# Patient Record
Sex: FEMALE | Race: AMERICAN INDIAN OR ALASKA NATIVE
[De-identification: names, ages, dates, MRNs, and addresses within clinical notes are randomized per-mention and may not be internally consistent; named-entity substitution may affect disease eponyms.]

---

## 2020-04-24 ENCOUNTER — HOSPITAL ENCOUNTER (INPATIENT)
Dept: HOSPITAL 11 - JP.ED | Age: 20
LOS: 3 days | Discharge: HOME | DRG: 871 | End: 2020-04-27
Attending: INTERNAL MEDICINE | Admitting: INTERNAL MEDICINE
Payer: MEDICAID

## 2020-04-24 DIAGNOSIS — Z90.49: ICD-10-CM

## 2020-04-24 DIAGNOSIS — F11.10: ICD-10-CM

## 2020-04-24 DIAGNOSIS — F15.10: ICD-10-CM

## 2020-04-24 DIAGNOSIS — E86.0: ICD-10-CM

## 2020-04-24 DIAGNOSIS — F17.210: ICD-10-CM

## 2020-04-24 DIAGNOSIS — J18.9: ICD-10-CM

## 2020-04-24 DIAGNOSIS — A41.01: Primary | ICD-10-CM

## 2020-04-24 PROCEDURE — C9113 INJ PANTOPRAZOLE SODIUM, VIA: HCPCS

## 2020-04-24 RX ADMIN — TAZOBACTAM SODIUM AND PIPERACILLIN SODIUM SCH MLS/HR: 375; 3 INJECTION, SOLUTION INTRAVENOUS at 23:11

## 2020-04-24 RX ADMIN — GUAIFENESIN AND CODEINE PHOSPHATE PRN ML: 10; 100 LIQUID ORAL at 23:54

## 2020-04-24 RX ADMIN — ALBUTEROL SULFATE PRN MG: 2.5 SOLUTION RESPIRATORY (INHALATION) at 20:17

## 2020-04-24 RX ADMIN — TAZOBACTAM SODIUM AND PIPERACILLIN SODIUM SCH MLS/HR: 375; 3 INJECTION, SOLUTION INTRAVENOUS at 17:28

## 2020-04-24 NOTE — EDM.PDOC
ED HPI GENERAL MEDICAL PROBLEM





- General


Chief Complaint: Chest Pain


Stated Complaint: MEDICAL VIA NORTH


Time Seen by Provider: 04/24/20 03:45


Source of Information: Reports: Patient, EMS


History Limitations: Reports: No Limitations





- History of Present Illness


INITIAL COMMENTS - FREE TEXT/NARRATIVE: 





pt is having severe chest pain. She is chilling. She has a known history of a 

iv drug user-- she uses meth. 


Onset: Today


Duration: Hour(s):


Location: Reports: Chest


Associated Symptoms: Reports: Chest Pain, Fever/Chills, Shortness of Breath, 

Other (pt does admit to using meth this am. )





- Related Data


 Allergies











Allergy/AdvReac Type Severity Reaction Status Date / Time


 


No Known Allergies Allergy   Verified 04/24/20 03:22











Home Meds: 


 Home Meds





NK [No Known Home Meds]  04/24/20 [History]











Past Medical History


OB/GYN History: Reports: Pregnancy


Musculoskeletal History: Reports: Fracture





- Past Surgical History


GI Surgical History: Reports: Cholecystectomy





Social & Family History





- Recreational Drug Use


Recreational Drug Use: Yes


Recreational Drug Type: Reports: Methamphetamine


Recreational Drug Use Frequency: Daily





ED ROS GENERAL





- Review of Systems


Review Of Systems: See Below


Constitutional: Reports: Fever, Chills, Malaise, Other (pain in the chest. )


HEENT: Reports: No Symptoms


Respiratory: Reports: Shortness of Breath, Pleuritic Chest Pain


Cardiovascular: Reports: Chest Pain, Palpitations


Endocrine: Reports: No Symptoms


GI/Abdominal: Reports: No Symptoms


: Reports: Other ( she is having a problem voiding. )


Musculoskeletal: Reports: No Symptoms


Skin: Reports: No Symptoms





ED EXAM, GENERAL





- Physical Exam


Exam: See Below


Free Text/Narrative:: 





pt arrived with severe chest pain with deep breathing. She feels very sob. She 

has good oxgenation but she has a tachycardia and rapid resp. 


Exam Limited By: No Limitations


General Appearance: Alert, Anxious, Moderate Distress


Ears: Normal TMs


Nose: Normal Inspection


Throat/Mouth: Normal Inspection


Head: Atraumatic


Neck: Normal Inspection


Respiratory/Chest: Rales, Other (pt had a resp rate of 38 on arrival. She has a 

fever. )


Cardiovascular: Regular Rate, Rhythm, Tachycardia, Other (pt has a sinus tach 

of 138)


GI/Abdominal: Soft, Non-Tender


 (Female) Exam: Deferred, Other (pt has a IUD  in place for birth control)


Rectal (Female) Exam: Deferred


Back Exam: Normal Inspection


Psychiatric: Anxious





Course





- Vital Signs


Last Recorded V/S: 


 Last Vital Signs











Temp  37.8 C   04/24/20 03:22


 


Pulse  132 H  04/24/20 03:22


 


Resp  38 H  04/24/20 03:22


 


BP  130/80   04/24/20 03:22


 


Pulse Ox  98   04/24/20 03:22














- Orders/Labs/Meds


Orders: 


 Active Orders 24 hr











 Category Date Time Status


 


 EKG Documentation Completion [RC] ASDIRECTED Care  04/24/20 04:00 Active


 


 Ang Chest [CT] Stat Exams  04/24/20 04:13 Ordered


 


 Chest 1V Frontal [CR] Stat Exams  04/24/20 03:37 Taken


 


 CULTURE BLOOD [BC] Urgent Lab  04/24/20 03:45 Received


 


 CULTURE BLOOD [BC] Urgent Lab  04/24/20 03:52 Received


 


 Iopamidol [Isovue-370 (76%)] Med  04/24/20 04:30 Active





 75 ml IV .AS DIRECTED   


 


 Piperacillin/Tazobactam [Zosyn] 4.5 gm Med  04/24/20 04:08 Active





 Sodium Chloride 0.9% [Normal Saline] 100 ml   





 IV ONETIME   


 


 Sodium Chloride 0.9% [Normal Saline] 1,000 ml Med  04/24/20 03:45 Active





 IV ASDIRECTED   


 


 Sodium Chloride 0.9% [Normal Saline] 1,000 ml Med  04/24/20 04:15 Active





 IV ASDIRECTED   


 


 Sodium Chloride 0.9% [Saline Flush] Med  04/24/20 04:30 Active





 10 ml FLUSH ONETIME PRN   


 


 Blood Culture x2 Reflex Set [OM.PC] Urgent Oth  04/24/20 03:43 Ordered


 


 EKG 12 Lead [EK] Routine Ther  04/24/20 04:00 Ordered








 Medication Orders





Sodium Chloride (Normal Saline)  1,000 mls @ 999 mls/hr IV ASDIRECTED CINDY


   Last Admin: 04/24/20 03:43  Dose: 999 mls/hr


Sodium Chloride (Normal Saline)  1,000 mls @ 999 mls/hr IV ASDIRECTED CINDY


   Last Admin: 04/24/20 04:40  Dose: 999 mls/hr


Piperacillin Sod/Tazobactam (Sod 4.5 gm/ Sodium Chloride)  100 mls @ 100 mls/hr 

IV ONETIME ONE


   Stop: 04/24/20 05:07


   Last Admin: 04/24/20 04:32  Dose: 100 mls/hr


Iopamidol (Isovue-370 (76%))  75 ml IV .AS DIRECTED CINDY


Sodium Chloride (Saline Flush)  10 ml FLUSH ONETIME PRN


   PRN Reason: PER RADIOLOGY PROTOCOL








Labs: 


 Laboratory Tests











  04/24/20 04/24/20 04/24/20 Range/Units





  03:45 03:45 03:45 


 


WBC  15.4 H    (4.5-11.0)  K/uL


 


RBC  4.56    (3.30-5.50)  M/uL


 


Hgb  12.2    (12.0-15.0)  g/dL


 


Hct  37.7    (36.0-48.0)  %


 


MCV  83    (80-98)  fL


 


MCH  27    (27-31)  pg


 


MCHC  32    (32-36)  %


 


Plt Count  407 H    (150-400)  K/uL


 


Neut % (Auto)  58    (36-66)  %


 


Lymph % (Auto)  29    (24-44)  %


 


Mono % (Auto)  13 H    (2-6)  %


 


Eos % (Auto)  0 L    (2-4)  %


 


Baso % (Auto)  1    (0-1)  %


 


VBG pH     (7.350-7.450)  


 


Sodium   130 L   (140-148)  mmol/L


 


Potassium   3.5 L   (3.6-5.2)  mmol/L


 


Chloride   98 L   (100-108)  mmol/L


 


Carbon Dioxide   23   (21-32)  mmol/L


 


Anion Gap   12.5   (5.0-14.0)  mmol/L


 


BUN   7   (7-18)  mg/dL


 


Creatinine   0.8   (0.6-1.0)  mg/dL


 


Est Cr Clr Drug Dosing   96.06   mL/min


 


Estimated GFR (MDRD)   > 60   (>60)  


 


Glucose   111 H   ()  mg/dL


 


Lactic Acid     (0.4-2.0)  mmol/L


 


Calcium   7.4 L   (8.5-10.1)  mg/dL


 


Total Bilirubin   0.6   (0.2-1.0)  mg/dL


 


AST   49 H   (15-37)  U/L


 


ALT   56   (12-78)  U/L


 


Alkaline Phosphatase   106   ()  U/L


 


Troponin I    < 0.017  (0.000-0.056)  ng/mL


 


Total Protein   7.4   (6.4-8.2)  g/dL


 


Albumin   2.2 L   (3.4-5.0)  g/dL


 


Globulin   5.2 H   (2.3-3.5)  g/dL


 


Albumin/Globulin Ratio   0.4 L   (1.2-2.2)  


 


Urine Color     (YELLOW)  


 


Urine Appearance     (CLEAR)  


 


Urine pH     (5.0-8.0)  


 


Ur Specific Gravity     (1.008-1.030)  


 


Urine Protein     (NEGATIVE)  mg/dL


 


Urine Glucose (UA)     (NEGATIVE)  mg/dL


 


Urine Ketones     (NEGATIVE)  mg/dL


 


Urine Occult Blood     (NEGATIVE)  


 


Urine Nitrite     (NEGATIVE)  


 


Urine Bilirubin     (NEGATIVE)  


 


Urine Urobilinogen     (0.2-1.0)  EU/dL


 


Ur Leukocyte Esterase     (NEGATIVE)  


 


Urine RBC     (0-5)  


 


Urine WBC     (0-5)  


 


Ur Epithelial Cells     


 


Amorphous Sediment     


 


Urine Bacteria     


 


Urine Mucus     


 


Urine HCG, Qual     


 


Urine Opiates Screen     (NEGATIVE)  


 


Ur Oxycodone Screen     (NEGATIVE)  


 


Urine Methadone Screen     (NEGATIVE)  


 


Ur Propoxyphene Screen     (NEGATIVE)  


 


Ur Barbiturates Screen     (NEGATIVE)  


 


Ur Tricyclics Screen     (NEGATIVE)  


 


Ur Phencyclidine Scrn     (NEGATIVE)  


 


Ur Amphetamine Screen     (NEGATIVE)  


 


U Methamphetamines Scrn     (NEGATIVE)  


 


Urine MDMA Screen     (NEGATIVE)  


 


U Benzodiazepines Scrn     (NEGATIVE)  


 


U Cocaine Metab Screen     (NEGATIVE)  


 


U Marijuana (THC) Screen     (NEGATIVE)  














  04/24/20 04/24/20 04/24/20 Range/Units





  03:45 03:54 03:54 


 


WBC     (4.5-11.0)  K/uL


 


RBC     (3.30-5.50)  M/uL


 


Hgb     (12.0-15.0)  g/dL


 


Hct     (36.0-48.0)  %


 


MCV     (80-98)  fL


 


MCH     (27-31)  pg


 


MCHC     (32-36)  %


 


Plt Count     (150-400)  K/uL


 


Neut % (Auto)     (36-66)  %


 


Lymph % (Auto)     (24-44)  %


 


Mono % (Auto)     (2-6)  %


 


Eos % (Auto)     (2-4)  %


 


Baso % (Auto)     (0-1)  %


 


VBG pH     (7.350-7.450)  


 


Sodium     (140-148)  mmol/L


 


Potassium     (3.6-5.2)  mmol/L


 


Chloride     (100-108)  mmol/L


 


Carbon Dioxide     (21-32)  mmol/L


 


Anion Gap     (5.0-14.0)  mmol/L


 


BUN     (7-18)  mg/dL


 


Creatinine     (0.6-1.0)  mg/dL


 


Est Cr Clr Drug Dosing     mL/min


 


Estimated GFR (MDRD)     (>60)  


 


Glucose     ()  mg/dL


 


Lactic Acid  1.4    (0.4-2.0)  mmol/L


 


Calcium     (8.5-10.1)  mg/dL


 


Total Bilirubin     (0.2-1.0)  mg/dL


 


AST     (15-37)  U/L


 


ALT     (12-78)  U/L


 


Alkaline Phosphatase     ()  U/L


 


Troponin I     (0.000-0.056)  ng/mL


 


Total Protein     (6.4-8.2)  g/dL


 


Albumin     (3.4-5.0)  g/dL


 


Globulin     (2.3-3.5)  g/dL


 


Albumin/Globulin Ratio     (1.2-2.2)  


 


Urine Color   Yellow   (YELLOW)  


 


Urine Appearance   Slightly cloudy A   (CLEAR)  


 


Urine pH   6.0   (5.0-8.0)  


 


Ur Specific Gravity   1.025   (1.008-1.030)  


 


Urine Protein   30 H   (NEGATIVE)  mg/dL


 


Urine Glucose (UA)   Negative   (NEGATIVE)  mg/dL


 


Urine Ketones   Negative   (NEGATIVE)  mg/dL


 


Urine Occult Blood   Negative   (NEGATIVE)  


 


Urine Nitrite   Negative   (NEGATIVE)  


 


Urine Bilirubin   Small H   (NEGATIVE)  


 


Urine Urobilinogen   4.0 H   (0.2-1.0)  EU/dL


 


Ur Leukocyte Esterase   Negative   (NEGATIVE)  


 


Urine RBC   0-5   (0-5)  


 


Urine WBC   0-5   (0-5)  


 


Ur Epithelial Cells   Few   


 


Amorphous Sediment   Not seen   


 


Urine Bacteria   Not seen   


 


Urine Mucus   Moderate   


 


Urine HCG, Qual     


 


Urine Opiates Screen    Negative  (NEGATIVE)  


 


Ur Oxycodone Screen    Negative  (NEGATIVE)  


 


Urine Methadone Screen    Negative  (NEGATIVE)  


 


Ur Propoxyphene Screen    Negative  (NEGATIVE)  


 


Ur Barbiturates Screen    Negative  (NEGATIVE)  


 


Ur Tricyclics Screen    Negative  (NEGATIVE)  


 


Ur Phencyclidine Scrn    Negative  (NEGATIVE)  


 


Ur Amphetamine Screen    Presumptive positive H  (NEGATIVE)  


 


U Methamphetamines Scrn    Presumptive positive H  (NEGATIVE)  


 


Urine MDMA Screen    Presumptive positive H  (NEGATIVE)  


 


U Benzodiazepines Scrn    Negative  (NEGATIVE)  


 


U Cocaine Metab Screen    Negative  (NEGATIVE)  


 


U Marijuana (THC) Screen    Presumptive positive H  (NEGATIVE)  














  04/24/20 04/24/20 Range/Units





  03:54 04:03 


 


WBC    (4.5-11.0)  K/uL


 


RBC    (3.30-5.50)  M/uL


 


Hgb    (12.0-15.0)  g/dL


 


Hct    (36.0-48.0)  %


 


MCV    (80-98)  fL


 


MCH    (27-31)  pg


 


MCHC    (32-36)  %


 


Plt Count    (150-400)  K/uL


 


Neut % (Auto)    (36-66)  %


 


Lymph % (Auto)    (24-44)  %


 


Mono % (Auto)    (2-6)  %


 


Eos % (Auto)    (2-4)  %


 


Baso % (Auto)    (0-1)  %


 


VBG pH   7.459 H  (7.350-7.450)  


 


Sodium    (140-148)  mmol/L


 


Potassium    (3.6-5.2)  mmol/L


 


Chloride    (100-108)  mmol/L


 


Carbon Dioxide    (21-32)  mmol/L


 


Anion Gap    (5.0-14.0)  mmol/L


 


BUN    (7-18)  mg/dL


 


Creatinine    (0.6-1.0)  mg/dL


 


Est Cr Clr Drug Dosing    mL/min


 


Estimated GFR (MDRD)    (>60)  


 


Glucose    ()  mg/dL


 


Lactic Acid    (0.4-2.0)  mmol/L


 


Calcium    (8.5-10.1)  mg/dL


 


Total Bilirubin    (0.2-1.0)  mg/dL


 


AST    (15-37)  U/L


 


ALT    (12-78)  U/L


 


Alkaline Phosphatase    ()  U/L


 


Troponin I    (0.000-0.056)  ng/mL


 


Total Protein    (6.4-8.2)  g/dL


 


Albumin    (3.4-5.0)  g/dL


 


Globulin    (2.3-3.5)  g/dL


 


Albumin/Globulin Ratio    (1.2-2.2)  


 


Urine Color    (YELLOW)  


 


Urine Appearance    (CLEAR)  


 


Urine pH    (5.0-8.0)  


 


Ur Specific Gravity    (1.008-1.030)  


 


Urine Protein    (NEGATIVE)  mg/dL


 


Urine Glucose (UA)    (NEGATIVE)  mg/dL


 


Urine Ketones    (NEGATIVE)  mg/dL


 


Urine Occult Blood    (NEGATIVE)  


 


Urine Nitrite    (NEGATIVE)  


 


Urine Bilirubin    (NEGATIVE)  


 


Urine Urobilinogen    (0.2-1.0)  EU/dL


 


Ur Leukocyte Esterase    (NEGATIVE)  


 


Urine RBC    (0-5)  


 


Urine WBC    (0-5)  


 


Ur Epithelial Cells    


 


Amorphous Sediment    


 


Urine Bacteria    


 


Urine Mucus    


 


Urine HCG, Qual  Negative   


 


Urine Opiates Screen    (NEGATIVE)  


 


Ur Oxycodone Screen    (NEGATIVE)  


 


Urine Methadone Screen    (NEGATIVE)  


 


Ur Propoxyphene Screen    (NEGATIVE)  


 


Ur Barbiturates Screen    (NEGATIVE)  


 


Ur Tricyclics Screen    (NEGATIVE)  


 


Ur Phencyclidine Scrn    (NEGATIVE)  


 


Ur Amphetamine Screen    (NEGATIVE)  


 


U Methamphetamines Scrn    (NEGATIVE)  


 


Urine MDMA Screen    (NEGATIVE)  


 


U Benzodiazepines Scrn    (NEGATIVE)  


 


U Cocaine Metab Screen    (NEGATIVE)  


 


U Marijuana (THC) Screen    (NEGATIVE)  











Meds: 


Medications











Generic Name Dose Route Start Last Admin





  Trade Name Freq  PRN Reason Stop Dose Admin


 


Sodium Chloride  1,000 mls @ 999 mls/hr  04/24/20 03:45  04/24/20 03:43





  Normal Saline  IV   999 mls/hr





  ASDIRECTED CINDY   Administration





     





     





     





     


 


Sodium Chloride  1,000 mls @ 999 mls/hr  04/24/20 04:15  04/24/20 04:40





  Normal Saline  IV   999 mls/hr





  ASDIRECTED CINDY   Administration





     





     





     





     


 


Piperacillin Sod/Tazobactam  100 mls @ 100 mls/hr  04/24/20 04:08  04/24/20 04:

32





  Sod 4.5 gm/ Sodium Chloride  IV  04/24/20 05:07  100 mls/hr





  ONETIME ONE   Administration





     





     





     





     


 


Iopamidol  75 ml  04/24/20 04:30  





  Isovue-370 (76%)  IV   





  .AS DIRECTED CINDY   





     





     





     





     


 


Sodium Chloride  10 ml  04/24/20 04:30  





  Saline Flush  FLUSH   





  ONETIME PRN   





  PER RADIOLOGY PROTOCOL   





     





     





     














Discontinued Medications














Generic Name Dose Route Start Last Admin





  Trade Name Freq  PRN Reason Stop Dose Admin


 


Acetaminophen  650 mg  04/24/20 04:04  04/24/20 04:07





  Tylenol  PO  04/24/20 04:05  650 mg





  NOW ONE   Administration





     





     





     





     


 


Hydromorphone HCl  0.5 mg  04/24/20 04:26  04/24/20 04:30





  Dilaudid  IVPUSH  04/24/20 04:27  0.5 mg





  ONETIME ONE   Administration





     





     





     





     


 


Sodium Chloride  Confirm  04/24/20 04:18  04/24/20 04:31





  Normal Saline  Administered  04/24/20 04:19  Not Given





  Dose   





  100 mls @ as directed   





  .ROUTE   





  .STK-MED ONE   





     





     





     





     


 


Sodium Chloride  90 mls @ 3 mls/sec  04/24/20 04:30  





  Normal Saline  IV  04/24/20 04:31  





  ONETIME ONE   





     





     





     





     


 


Lorazepam  0.5 mg  04/24/20 04:17  04/24/20 04:31





  Ativan  IVPUSH  04/24/20 04:18  0.5 mg





  ONETIME ONE   Administration





     





     





     





     














- Re-Assessments/Exams


Free Text/Narrative Re-Assessment/Exam: 





04/24/20 04:52


pt denies being around anyone who has been ill, She has not traveled and she 

has has not been in [place]. She has a fever but she has not had a cough, 

She is having pleuritic chest pain. 





Departure





- Departure


Time of Disposition: 04:58


Disposition: Admitted As Inpatient 64


Condition: Fair


Clinical Impression: 


 Pleuritic chest pain, IV drug abuse, Sepsis, Dehydration








- Discharge Information


Referrals: 


PCP,None [Primary Care Provider] - 


Forms:  ED Department Discharge


Care Plan Goals: 


admit to [facility]





Sepsis Event Note





- Evaluation


Sepsis Screening Result: Possible Sepsis Risk





- Focused Exam


Vital Signs: 


 Vital Signs











  Temp Pulse Resp BP Pulse Ox


 


 04/24/20 03:22  37.8 C  132 H  38 H  130/80  98











Date Exam was Performed: 04/24/20


Time Exam was Performed: 04:58





- My Orders


Last 24 Hours: 


My Active Orders





04/24/20 03:37


Chest 1V Frontal [CR] Stat 





04/24/20 03:43


Blood Culture x2 Reflex Set [OM.PC] Urgent 





04/24/20 03:45


CULTURE BLOOD [BC] Urgent 


Sodium Chloride 0.9% [Normal Saline] 1,000 ml IV ASDIRECTED 





04/24/20 03:52


CULTURE BLOOD [BC] Urgent 





04/24/20 04:00


EKG Documentation Completion [RC] ASDIRECTED 


EKG 12 Lead [EK] Routine 





04/24/20 04:08


Piperacillin/Tazobactam [Zosyn] 4.5 gm   Sodium Chloride 0.9% [Normal Saline] 

100 ml IV ONETIME 





04/24/20 04:13


Ang Chest [CT] Stat 





04/24/20 04:15


Sodium Chloride 0.9% [Normal Saline] 1,000 ml IV ASDIRECTED 





04/24/20 04:30


Iopamidol [Isovue-370 (76%)]   75 ml IV .AS DIRECTED 


Sodium Chloride 0.9% [Saline Flush]   10 ml FLUSH ONETIME PRN 














- Assessment/Plan


Last 24 Hours: 


My Active Orders





04/24/20 03:37


Chest 1V Frontal [CR] Stat 





04/24/20 03:43


Blood Culture x2 Reflex Set [OM.PC] Urgent 





04/24/20 03:45


CULTURE BLOOD [BC] Urgent 


Sodium Chloride 0.9% [Normal Saline] 1,000 ml IV ASDIRECTED 





04/24/20 03:52


CULTURE BLOOD [BC] Urgent 





04/24/20 04:00


EKG Documentation Completion [RC] ASDIRECTED 


EKG 12 Lead [EK] Routine 





04/24/20 04:08


Piperacillin/Tazobactam [Zosyn] 4.5 gm   Sodium Chloride 0.9% [Normal Saline] 

100 ml IV ONETIME 





04/24/20 04:13


Ang Chest [CT] Stat 





04/24/20 04:15


Sodium Chloride 0.9% [Normal Saline] 1,000 ml IV ASDIRECTED 





04/24/20 04:30


Iopamidol [Isovue-370 (76%)]   75 ml IV .AS DIRECTED 


Sodium Chloride 0.9% [Saline Flush]   10 ml FLUSH ONETIME PRN

## 2020-04-24 NOTE — CRLCT
INDICATION:



Shortness of breath and pleuritic pain



TECHNIQUE:



CT chest pulmonary angiogram acquired with IV contrast. 75 cc Isovue 370 



COMPARISON:



None 



FINDINGS:



Cardiovascular structures: Normal vascular enhancement of the pulmonary 

arteries, no sign of pulmonary embolism.  Heart size is normal.  No sign of 

aneurysm or dissection in the thoracic aorta.  



Mediastinum and jesus: No mass or adenopathy.  



Lungs: Right middle and right lower lobe opacities consistent with 

atelectasis or pneumonia. 



Pleura and pericardium: No effusions.  



Chest wall and axilla: No mass or adenopathy.  



Bones: No significant findings.  



Upper abdomen: Unremarkable.  



IMPRESSION:



No evidence for pulmonary embolus. 



Right middle and right lower lobe opacities consistent with atelectasis 

versus pneumonia.



Dictated by Dominick Rodriguez MD @ 4/24/2020 5:56:57 AM



Please note that all CT scans at this facility use dose modulation, 

iterative reconstruction, and/or weight-based dosing when appropriate to 

reduce radiation dose to as low as reasonably achievable.



Dictated by: Dominick Rodriguez MD @ 04/24/2020 05:57:04



(Electronically Signed)

## 2020-04-24 NOTE — PCM.HP.2
H&P History of Present Illness





- General


Date of Service: 04/24/20


Admit Problem/Dx: 


 Admission Diagnosis/Problem





Admission Diagnosis/Problem      Pneumonia








Source of Information: Patient, EMS, Provider, RN


History Limitations: Reports: No Limitations





- History of Present Illness


Initial Comments - Free Text/Narative: 





chief complaint: shortness of breath with painful chest








This is a 19 year old female present to the ER  via EMS for concerns 

respiratory illness for the past 4 days. Reports middle to upper right sided 

back pain, thought she was getting a bladder infection but didn't have any 

urinary symptoms. For the past two days has been short of breath and coughing 

up a clear jelly like mucous that taste like salt from her lungs. reports no 

travel outside of the [facility].





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 Heroin 

- last use 2 days ago. She was in a Suboxone Program 2 years ago but quit. 

Injects into arms only - noticed "pus" coming out of the right AC arm a few 

days ago - none since. 





Onset of Symptoms: Reports: Gradual


Symptom Onset Date: 04/20/20


Duration of Symptoms: Reports: Day(s):, Getting Worse


Location: Reports: Chest, Generalized (not feeling well)


Quality: Reports: Ache, Sharp


Severity: Severe


Improves with: Reports: None


Worsens with: Reports: Breathing


Associated Symptoms: Reports: Chest Pain, Cough, Fever/Chills, Loss of Appetite 

(last meal Thursday morning.), Malaise, Nausea/Vomiting (nausea vomited once 

after coughing), Shortness of Breath (for the past two days.)





- Related Data


Allergies/Adverse Reactions: 


 Allergies











Allergy/AdvReac Type Severity Reaction Status Date / Time


 


No Known Allergies Allergy   Verified 04/24/20 03:22











Home Medications: 


 Home Meds





NK [No Known Home Meds]  04/24/20 [History]











Past Medical History


OB/GYN History: Reports: Pregnancy


Musculoskeletal History: Reports: Fracture





- Past Surgical History


GI Surgical History: Reports: Cholecystectomy





Social & Family History





- Tobacco Use


Smoking Status *Q: Current Every Day Smoker


Tobacco Use Within Last Twelve Months: Cigarettes


Packs/Tins Daily: 0.5





- Recreational Drug Use


Recreational Drug Use: Yes


Recreational Drug Type: Reports: Methamphetamine


Recreational Drug Use Frequency: Daily





- Living Situation & Occupation


Living situation: Reports: Single, with Family


Occupation: Unemployed (lives with Dad in [facility]. Has two 

children ages 8 years and 7 years - does not have custody they live with her 

Cousin.)





H&P Review of Systems





- Review of Systems:


Review Of Systems: See Below


General: Reports: Malaise, Weakness, Decreased Appetite


HEENT: Reports: No Symptoms


Pulmonary: Reports: Shortness of Breath, Pleuritic Chest Pain, Cough (clear 

jelly like mucous), Sputum (clear jelly like sputum salty taste)


Cardiovascular: Reports: Dyspnea on Exertion


Gastrointestinal: Reports: Diarrhea (a couple of days ago, no bowel movement x2 

days), Nausea


Genitourinary: Reports: No Symptoms


Musculoskeletal: Reports: Back Pain (mid to upper back pain)


Skin: Reports: Other (IV injection sites bilateral arms and wrist)


Psychiatric: Reports: No Symptoms


Neurological: Reports: No Symptoms


Hematologic/Lymphatic: Reports: No Symptoms


Immunologic: Reports: No Symptoms





Exam





- Exam


Exam: See Below





- Vital Signs


Vital Signs: 


 Last Vital Signs











Temp  37.8 C   04/24/20 05:16


 


Pulse  118 H  04/24/20 06:05


 


Resp  34 H  04/24/20 06:05


 


BP  109/52 L  04/24/20 06:05


 


Pulse Ox  100   04/24/20 06:05











Weight: 81.647 kg





- Exam


Quality Assessment: Supplemental Oxygen, DVT Prophylaxis


General: Alert, Oriented, Cooperative, Mild Distress, Other (pleasant young 

 female.)


HEENT: PERRLA, Conjunctiva Clear, EACs Clear, EOMI, Hearing Intact, Mucosa 

Moist & Pink, Nares Patent, Normal Nasal Septum, Posterior Pharynx Clear, TMs 

Clear, Other (teeth without evidence of active dental disease or dental decay.)


Neck: Supple, Trachea Midline


Lungs: Clear to Auscultation, Other (resp rate 38 upon arrival decrease to 24 

with IV Ativan and IV Dilaudid)


Cardiovascular: Regular Rhythm, Normal S1, Normal S2, Tachycardia (rate 132 to 

123, ST)


GI/Abdominal Exam: Normal Bowel Sounds, Soft, Non-Tender, No Distention, No Mass


 (Female) Exam: Deferred


Rectal (Female) Exam: Normal Exam


Extremities: Normal Range of Motion, Non-Tender, Other (IV drug abuse, 

injection sites noted bilateral arms and wrist without signs of infection, 

discharge or erythema. )


Peripheral Pulses: 2+: Radial (L), Radial (R)


Skin: Warm, Dry, Other (bilateral IV drug injeciton sites to bilateral arms and 

wrist.)


Neurological: Reflexes Equal Bilateral, Strength Equal Bilateral


Neuro Extensive - Mental Status: Alert, Oriented x3, Normal Mood/Affect, Normal 

Cognition, Memory Intact


Psychiatric: Alert, Normal Affect, Normal Mood





- Patient Data


Lab Results Last 24 hrs: 


 Laboratory Results - last 24 hr











  04/24/20 04/24/20 04/24/20 Range/Units





  03:45 03:45 03:45 


 


WBC  15.4 H    (4.5-11.0)  K/uL


 


RBC  4.56    (3.30-5.50)  M/uL


 


Hgb  12.2    (12.0-15.0)  g/dL


 


Hct  37.7    (36.0-48.0)  %


 


MCV  83    (80-98)  fL


 


MCH  27    (27-31)  pg


 


MCHC  32    (32-36)  %


 


Plt Count  407 H    (150-400)  K/uL


 


Neut % (Auto)  58    (36-66)  %


 


Lymph % (Auto)  29    (24-44)  %


 


Mono % (Auto)  13 H    (2-6)  %


 


Eos % (Auto)  0 L    (2-4)  %


 


Baso % (Auto)  1    (0-1)  %


 


ESR     (0-25)  mm/hr


 


VBG pH     (7.350-7.450)  


 


Sodium   130 L   (140-148)  mmol/L


 


Potassium   3.5 L   (3.6-5.2)  mmol/L


 


Chloride   98 L   (100-108)  mmol/L


 


Carbon Dioxide   23   (21-32)  mmol/L


 


Anion Gap   12.5   (5.0-14.0)  mmol/L


 


BUN   7   (7-18)  mg/dL


 


Creatinine   0.8   (0.6-1.0)  mg/dL


 


Est Cr Clr Drug Dosing   96.06   mL/min


 


Estimated GFR (MDRD)   > 60   (>60)  


 


Glucose   111 H   ()  mg/dL


 


Lactic Acid     (0.4-2.0)  mmol/L


 


Calcium   7.4 L   (8.5-10.1)  mg/dL


 


Total Bilirubin   0.6   (0.2-1.0)  mg/dL


 


AST   49 H   (15-37)  U/L


 


ALT   56   (12-78)  U/L


 


Alkaline Phosphatase   106   ()  U/L


 


Lactate Dehydrogenase     ()  U/L


 


Troponin I    < 0.017  (0.000-0.056)  ng/mL


 


C-Reactive Protein     (0.0-0.3)  mg/dL


 


Total Protein   7.4   (6.4-8.2)  g/dL


 


Albumin   2.2 L   (3.4-5.0)  g/dL


 


Globulin   5.2 H   (2.3-3.5)  g/dL


 


Albumin/Globulin Ratio   0.4 L   (1.2-2.2)  


 


Procalcitonin     ng/mL


 


Urine Color     (YELLOW)  


 


Urine Appearance     (CLEAR)  


 


Urine pH     (5.0-8.0)  


 


Ur Specific Gravity     (1.008-1.030)  


 


Urine Protein     (NEGATIVE)  mg/dL


 


Urine Glucose (UA)     (NEGATIVE)  mg/dL


 


Urine Ketones     (NEGATIVE)  mg/dL


 


Urine Occult Blood     (NEGATIVE)  


 


Urine Nitrite     (NEGATIVE)  


 


Urine Bilirubin     (NEGATIVE)  


 


Urine Urobilinogen     (0.2-1.0)  EU/dL


 


Ur Leukocyte Esterase     (NEGATIVE)  


 


Urine RBC     (0-5)  


 


Urine WBC     (0-5)  


 


Ur Epithelial Cells     


 


Amorphous Sediment     


 


Urine Bacteria     


 


Urine Mucus     


 


Urine HCG, Qual     


 


Urine Opiates Screen     (NEGATIVE)  


 


Ur Oxycodone Screen     (NEGATIVE)  


 


Urine Methadone Screen     (NEGATIVE)  


 


Ur Propoxyphene Screen     (NEGATIVE)  


 


Ur Barbiturates Screen     (NEGATIVE)  


 


Ur Tricyclics Screen     (NEGATIVE)  


 


Ur Phencyclidine Scrn     (NEGATIVE)  


 


Ur Amphetamine Screen     (NEGATIVE)  


 


U Methamphetamines Scrn     (NEGATIVE)  


 


Urine MDMA Screen     (NEGATIVE)  


 


U Benzodiazepines Scrn     (NEGATIVE)  


 


U Cocaine Metab Screen     (NEGATIVE)  


 


U Marijuana (THC) Screen     (NEGATIVE)  














  04/24/20 04/24/20 04/24/20 Range/Units





  03:45 03:54 03:54 


 


WBC     (4.5-11.0)  K/uL


 


RBC     (3.30-5.50)  M/uL


 


Hgb     (12.0-15.0)  g/dL


 


Hct     (36.0-48.0)  %


 


MCV     (80-98)  fL


 


MCH     (27-31)  pg


 


MCHC     (32-36)  %


 


Plt Count     (150-400)  K/uL


 


Neut % (Auto)     (36-66)  %


 


Lymph % (Auto)     (24-44)  %


 


Mono % (Auto)     (2-6)  %


 


Eos % (Auto)     (2-4)  %


 


Baso % (Auto)     (0-1)  %


 


ESR     (0-25)  mm/hr


 


VBG pH     (7.350-7.450)  


 


Sodium     (140-148)  mmol/L


 


Potassium     (3.6-5.2)  mmol/L


 


Chloride     (100-108)  mmol/L


 


Carbon Dioxide     (21-32)  mmol/L


 


Anion Gap     (5.0-14.0)  mmol/L


 


BUN     (7-18)  mg/dL


 


Creatinine     (0.6-1.0)  mg/dL


 


Est Cr Clr Drug Dosing     mL/min


 


Estimated GFR (MDRD)     (>60)  


 


Glucose     ()  mg/dL


 


Lactic Acid  1.4    (0.4-2.0)  mmol/L


 


Calcium     (8.5-10.1)  mg/dL


 


Total Bilirubin     (0.2-1.0)  mg/dL


 


AST     (15-37)  U/L


 


ALT     (12-78)  U/L


 


Alkaline Phosphatase     ()  U/L


 


Lactate Dehydrogenase     ()  U/L


 


Troponin I     (0.000-0.056)  ng/mL


 


C-Reactive Protein     (0.0-0.3)  mg/dL


 


Total Protein     (6.4-8.2)  g/dL


 


Albumin     (3.4-5.0)  g/dL


 


Globulin     (2.3-3.5)  g/dL


 


Albumin/Globulin Ratio     (1.2-2.2)  


 


Procalcitonin     ng/mL


 


Urine Color   Yellow   (YELLOW)  


 


Urine Appearance   Slightly cloudy A   (CLEAR)  


 


Urine pH   6.0   (5.0-8.0)  


 


Ur Specific Gravity   1.025   (1.008-1.030)  


 


Urine Protein   30 H   (NEGATIVE)  mg/dL


 


Urine Glucose (UA)   Negative   (NEGATIVE)  mg/dL


 


Urine Ketones   Negative   (NEGATIVE)  mg/dL


 


Urine Occult Blood   Negative   (NEGATIVE)  


 


Urine Nitrite   Negative   (NEGATIVE)  


 


Urine Bilirubin   Small H   (NEGATIVE)  


 


Urine Urobilinogen   4.0 H   (0.2-1.0)  EU/dL


 


Ur Leukocyte Esterase   Negative   (NEGATIVE)  


 


Urine RBC   0-5   (0-5)  


 


Urine WBC   0-5   (0-5)  


 


Ur Epithelial Cells   Few   


 


Amorphous Sediment   Not seen   


 


Urine Bacteria   Not seen   


 


Urine Mucus   Moderate   


 


Urine HCG, Qual     


 


Urine Opiates Screen    Negative  (NEGATIVE)  


 


Ur Oxycodone Screen    Negative  (NEGATIVE)  


 


Urine Methadone Screen    Negative  (NEGATIVE)  


 


Ur Propoxyphene Screen    Negative  (NEGATIVE)  


 


Ur Barbiturates Screen    Negative  (NEGATIVE)  


 


Ur Tricyclics Screen    Negative  (NEGATIVE)  


 


Ur Phencyclidine Scrn    Negative  (NEGATIVE)  


 


Ur Amphetamine Screen    Presumptive positive H  (NEGATIVE)  


 


U Methamphetamines Scrn    Presumptive positive H  (NEGATIVE)  


 


Urine MDMA Screen    Presumptive positive H  (NEGATIVE)  


 


U Benzodiazepines Scrn    Negative  (NEGATIVE)  


 


U Cocaine Metab Screen    Negative  (NEGATIVE)  


 


U Marijuana (THC) Screen    Presumptive positive H  (NEGATIVE)  














  04/24/20 04/24/20 04/24/20 Range/Units





  03:54 04:00 04:00 


 


WBC     (4.5-11.0)  K/uL


 


RBC     (3.30-5.50)  M/uL


 


Hgb     (12.0-15.0)  g/dL


 


Hct     (36.0-48.0)  %


 


MCV     (80-98)  fL


 


MCH     (27-31)  pg


 


MCHC     (32-36)  %


 


Plt Count     (150-400)  K/uL


 


Neut % (Auto)     (36-66)  %


 


Lymph % (Auto)     (24-44)  %


 


Mono % (Auto)     (2-6)  %


 


Eos % (Auto)     (2-4)  %


 


Baso % (Auto)     (0-1)  %


 


ESR    101 H  (0-25)  mm/hr


 


VBG pH     (7.350-7.450)  


 


Sodium     (140-148)  mmol/L


 


Potassium     (3.6-5.2)  mmol/L


 


Chloride     (100-108)  mmol/L


 


Carbon Dioxide     (21-32)  mmol/L


 


Anion Gap     (5.0-14.0)  mmol/L


 


BUN     (7-18)  mg/dL


 


Creatinine     (0.6-1.0)  mg/dL


 


Est Cr Clr Drug Dosing     mL/min


 


Estimated GFR (MDRD)     (>60)  


 


Glucose     ()  mg/dL


 


Lactic Acid     (0.4-2.0)  mmol/L


 


Calcium     (8.5-10.1)  mg/dL


 


Total Bilirubin     (0.2-1.0)  mg/dL


 


AST     (15-37)  U/L


 


ALT     (12-78)  U/L


 


Alkaline Phosphatase     ()  U/L


 


Lactate Dehydrogenase     ()  U/L


 


Troponin I     (0.000-0.056)  ng/mL


 


C-Reactive Protein   17.55 H   (0.0-0.3)  mg/dL


 


Total Protein     (6.4-8.2)  g/dL


 


Albumin     (3.4-5.0)  g/dL


 


Globulin     (2.3-3.5)  g/dL


 


Albumin/Globulin Ratio     (1.2-2.2)  


 


Procalcitonin     ng/mL


 


Urine Color     (YELLOW)  


 


Urine Appearance     (CLEAR)  


 


Urine pH     (5.0-8.0)  


 


Ur Specific Gravity     (1.008-1.030)  


 


Urine Protein     (NEGATIVE)  mg/dL


 


Urine Glucose (UA)     (NEGATIVE)  mg/dL


 


Urine Ketones     (NEGATIVE)  mg/dL


 


Urine Occult Blood     (NEGATIVE)  


 


Urine Nitrite     (NEGATIVE)  


 


Urine Bilirubin     (NEGATIVE)  


 


Urine Urobilinogen     (0.2-1.0)  EU/dL


 


Ur Leukocyte Esterase     (NEGATIVE)  


 


Urine RBC     (0-5)  


 


Urine WBC     (0-5)  


 


Ur Epithelial Cells     


 


Amorphous Sediment     


 


Urine Bacteria     


 


Urine Mucus     


 


Urine HCG, Qual  Negative    


 


Urine Opiates Screen     (NEGATIVE)  


 


Ur Oxycodone Screen     (NEGATIVE)  


 


Urine Methadone Screen     (NEGATIVE)  


 


Ur Propoxyphene Screen     (NEGATIVE)  


 


Ur Barbiturates Screen     (NEGATIVE)  


 


Ur Tricyclics Screen     (NEGATIVE)  


 


Ur Phencyclidine Scrn     (NEGATIVE)  


 


Ur Amphetamine Screen     (NEGATIVE)  


 


U Methamphetamines Scrn     (NEGATIVE)  


 


Urine MDMA Screen     (NEGATIVE)  


 


U Benzodiazepines Scrn     (NEGATIVE)  


 


U Cocaine Metab Screen     (NEGATIVE)  


 


U Marijuana (THC) Screen     (NEGATIVE)  














  04/24/20 04/24/20 04/24/20 Range/Units





  04:00 04:00 04:03 


 


WBC     (4.5-11.0)  K/uL


 


RBC     (3.30-5.50)  M/uL


 


Hgb     (12.0-15.0)  g/dL


 


Hct     (36.0-48.0)  %


 


MCV     (80-98)  fL


 


MCH     (27-31)  pg


 


MCHC     (32-36)  %


 


Plt Count     (150-400)  K/uL


 


Neut % (Auto)     (36-66)  %


 


Lymph % (Auto)     (24-44)  %


 


Mono % (Auto)     (2-6)  %


 


Eos % (Auto)     (2-4)  %


 


Baso % (Auto)     (0-1)  %


 


ESR     (0-25)  mm/hr


 


VBG pH    7.459 H  (7.350-7.450)  


 


Sodium     (140-148)  mmol/L


 


Potassium     (3.6-5.2)  mmol/L


 


Chloride     (100-108)  mmol/L


 


Carbon Dioxide     (21-32)  mmol/L


 


Anion Gap     (5.0-14.0)  mmol/L


 


BUN     (7-18)  mg/dL


 


Creatinine     (0.6-1.0)  mg/dL


 


Est Cr Clr Drug Dosing     mL/min


 


Estimated GFR (MDRD)     (>60)  


 


Glucose     ()  mg/dL


 


Lactic Acid     (0.4-2.0)  mmol/L


 


Calcium     (8.5-10.1)  mg/dL


 


Total Bilirubin     (0.2-1.0)  mg/dL


 


AST     (15-37)  U/L


 


ALT     (12-78)  U/L


 


Alkaline Phosphatase     ()  U/L


 


Lactate Dehydrogenase  282 H    ()  U/L


 


Troponin I     (0.000-0.056)  ng/mL


 


C-Reactive Protein     (0.0-0.3)  mg/dL


 


Total Protein     (6.4-8.2)  g/dL


 


Albumin     (3.4-5.0)  g/dL


 


Globulin     (2.3-3.5)  g/dL


 


Albumin/Globulin Ratio     (1.2-2.2)  


 


Procalcitonin   0.22   ng/mL


 


Urine Color     (YELLOW)  


 


Urine Appearance     (CLEAR)  


 


Urine pH     (5.0-8.0)  


 


Ur Specific Gravity     (1.008-1.030)  


 


Urine Protein     (NEGATIVE)  mg/dL


 


Urine Glucose (UA)     (NEGATIVE)  mg/dL


 


Urine Ketones     (NEGATIVE)  mg/dL


 


Urine Occult Blood     (NEGATIVE)  


 


Urine Nitrite     (NEGATIVE)  


 


Urine Bilirubin     (NEGATIVE)  


 


Urine Urobilinogen     (0.2-1.0)  EU/dL


 


Ur Leukocyte Esterase     (NEGATIVE)  


 


Urine RBC     (0-5)  


 


Urine WBC     (0-5)  


 


Ur Epithelial Cells     


 


Amorphous Sediment     


 


Urine Bacteria     


 


Urine Mucus     


 


Urine HCG, Qual     


 


Urine Opiates Screen     (NEGATIVE)  


 


Ur Oxycodone Screen     (NEGATIVE)  


 


Urine Methadone Screen     (NEGATIVE)  


 


Ur Propoxyphene Screen     (NEGATIVE)  


 


Ur Barbiturates Screen     (NEGATIVE)  


 


Ur Tricyclics Screen     (NEGATIVE)  


 


Ur Phencyclidine Scrn     (NEGATIVE)  


 


Ur Amphetamine Screen     (NEGATIVE)  


 


U Methamphetamines Scrn     (NEGATIVE)  


 


Urine MDMA Screen     (NEGATIVE)  


 


U Benzodiazepines Scrn     (NEGATIVE)  


 


U Cocaine Metab Screen     (NEGATIVE)  


 


U Marijuana (THC) Screen     (NEGATIVE)  











Result Diagrams: 


 04/24/20 03:45





 04/24/20 03:45





Sepsis Event Note





- Evaluation


Sepsis Screening Result: Possible Sepsis Risk





- Focused Exam


Vital Signs: 


 Vital Signs











  Temp Pulse Resp BP Pulse Ox


 


 04/24/20 06:05   118 H  34 H  109/52 L  100


 


 04/24/20 05:16  37.8 C  123 H  38 H  130/80  97


 


 04/24/20 03:22  37.8 C  132 H  38 H  130/80  98











Date Exam was Performed: 04/24/20


Time Exam was Performed: 07:11





- Problem List


(1) Pneumonia


SNOMED Code(s): 635057599


   ICD Code: J18.9 - PNEUMONIA, UNSPECIFIED ORGANISM   Status: Acute   Priority

: High   Current Visit: Yes   


Qualifiers: 


   Pneumonia type: due to unspecified organism 





(2) IV drug abuse


SNOMED Code(s): 477737549, 798220579


   ICD Code: F19.10 - OTHER PSYCHOACTIVE SUBSTANCE ABUSE, UNCOMPLICATED   Status

: Acute   Priority: High   Current Visit: Yes   





(3) Tobacco abuse


SNOMED Code(s): 275289321


   ICD Code: Z72.0 - TOBACCO USE   Status: Chronic   Priority: High   Current 

Visit: Yes   


Problem List Initiated/Reviewed/Updated: Yes


Orders Last 24hrs: 


 Active Orders 24 hr











 Category Date Time Status


 


 Cardiac Monitoring [RC] CONTINUOUS Care  04/24/20 06:12 Active


 


 Intake and Output [RC] QSHIFT Care  04/24/20 06:12 Active


 


 May Shower [RC] ASDIRECTED Care  04/24/20 06:12 Active


 


 Notify Provider Vital Signs [RC] ASDIRECTED Care  04/24/20 06:12 Active


 


 Oxygen Therapy [RC] PRN Care  04/24/20 06:12 Active


 


 Pneumonia Education [RC] DAILY Care  04/24/20 06:12 Active


 


 Pulse Oximetry [RC] CONTINUOUS Care  04/24/20 06:12 Active


 


 RT Aerosol Therapy [RC] ASDIRECTED Care  04/24/20 06:12 Active


 


 RT Incentive Spirometry [RC] Q2HWA Care  04/24/20 06:12 Active


 


 Up ad Leanne [RC] ASDIRECTED Care  04/24/20 06:12 Active


 


 VTE/DVT Education [RC] Per Unit Routine Care  04/24/20 06:12 Active


 


 Vital Signs [RC] Q4H Care  04/24/20 06:12 Active


 


 Regular Diet [DIET] Diet  04/24/20 Breakfast Active


 


 Chest 1V Frontal [CR] Stat Exams  04/24/20 03:37 Taken


 


 Chest 2V [CR] AM Exams  04/25/20 05:11 Ordered


 


 CULTURE BLOOD [BC] Urgent Lab  04/24/20 03:45 Received


 


 CULTURE BLOOD [BC] Urgent Lab  04/24/20 03:52 Received


 


 CULTURE RESPIRATORY + SMEAR [RM] Urgent Lab  04/24/20 06:12 Ordered


 


 Acetaminophen [Tylenol] Med  04/24/20 06:12 Ordered





 650 mg PO Q4H PRN   


 


 Albuterol [Proventil Neb Soln] Med  04/24/20 06:12 Ordered





 2.5 mg NEB Q4H PRN   


 


 Albuterol/Ipratropium [DuoNeb 3.0-0.5 MG/3 ML] Med  04/24/20 06:12 Ordered





 3 ml NEB QID   


 


 Docusate Sodium [Colace] Med  04/24/20 06:12 Ordered





 100 mg PO BID PRN   


 


 Enoxaparin [Lovenox] Med  04/24/20 09:00 Ordered





 30 mg SUBCUT DAILY   


 


 LORazepam [Ativan] Med  04/24/20 06:12 Ordered





 1 mg IV Q4H PRN   


 


 Nicotine [Habitrol] Med  04/24/20 06:12 Ordered





 14 mg TRDERM DAILY   


 


 Ondansetron [Zofran ODT] Med  04/24/20 06:12 Ordered





 4 mg PO Q6H PRN   


 


 Ondansetron [Zofran] Med  04/24/20 06:12 Ordered





 4 mg IV Q4H PRN   


 


 Piperacillin/Tazobactam [Zosyn] 4.5 gm Med  04/24/20 06:12 Ordered





 Sodium Chloride 0.9% [Normal Saline] 100 ml   





 IV Q6H   


 


 Sodium Chloride 0.9% [Normal Saline] 1,000 ml Med  04/24/20 06:12 Ordered





 IV ASDIRECTED   


 


 Vancomycin Med  04/24/20 06:00 Active





 1 gm IV .PHARMACY TO DOSE   


 


 Vancomycin 1 gm Med  04/24/20 07:00 Active





 Sodium Chloride 0.9% [Normal Saline] 250 ml   





 IV ONETIME   


 


 bisacodyL [Dulcolax] Med  04/24/20 06:12 Ordered





 5 mg PO DAILY PRN   


 


 oxyCODONE Med  04/24/20 06:12 Ordered





 10 mg PO Q4H PRN   


 


 Blood Culture x2 Reflex Set [OM.PC] Urgent Oth  04/24/20 03:43 Ordered


 


 Give supplemental Oxygen PRN [COMM] Routine Oth  04/24/20 06:12 Ordered


 


 Isolation [COMM] Routine Oth  04/24/20 06:12 Ordered


 


 Resuscitation Status Routine Resus Stat  04/24/20 05:45 Ordered


 


 EKG 12 Lead [EK] Routine Ther  04/24/20 04:00 Stop Req








 Medication Orders





Acetaminophen (Tylenol)  650 mg PO Q4H PRN


   PRN Reason: Pain (Mild 1-3)/fever


Albuterol (Proventil Neb Soln)  2.5 mg NEB Q4H PRN


   PRN Reason: Shortness Of Breath/wheezing


Albuterol/Ipratropium (Duoneb 3.0-0.5 Mg/3 Ml)  3 ml NEB QID CINDY


Bisacodyl (Dulcolax)  5 mg PO DAILY PRN


   PRN Reason: Constipation


Docusate Sodium (Colace)  100 mg PO BID PRN


   PRN Reason: Constipation


Enoxaparin Sodium (Lovenox)  30 mg SUBCUT DAILY [facility]


Piperacillin Sod/Tazobactam (Sod 4.5 gm/ Sodium Chloride)  100 mls @ 200 mls/hr 

IV Q6H [facility]


Sodium Chloride (Normal Saline)  1,000 mls @ 125 mls/hr IV ASDIRECTED [facility]


Vancomycin HCl 1 gm/ Sodium (Chloride)  250 mls @ 166.667 mls/hr IV ONETIME ONE


   Stop: 04/24/20 08:29


Lorazepam (Ativan)  1 mg IV Q4H PRN


   PRN Reason: Agitation


Nicotine (Habitrol)  14 mg TRDERM DAILY [facility]


Ondansetron HCl (Zofran Odt)  4 mg PO Q6H PRN


   PRN Reason: Nausea able to take PO


Ondansetron HCl (Zofran)  4 mg IV Q4H PRN


   PRN Reason: Nausea/Vomiting


Oxycodone HCl (Oxycodone)  10 mg PO Q4H PRN


   PRN Reason: Pain (moderate 4-6)


Vancomycin HCl (Vancomycin)  1 gm IV .PHARMACY TO DOSE CINDY








Assessment/Plan Comment:: 





Assessment/Plan Comment:: 


ASSESSMENT AND PLAN OF CARE -Pneumonia





This is a 19 year old female present to the ER  via EMS for concerns 

respiratory illness for the past 4 days. Reports middle to upper right sided 

back pain, thought she was getting a bladder infection but didn't have any 

urinary symptoms. For the past two days has been short of breath and coughing 

up a clear jelly like mucous that taste like salt from her lungs. reports no 

travel outside of the [facility].





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 Heroin 

- last use 2 days ago. She was in a Suboxone Program 2 years ago but quit. 

Injects into arms only - noticed "pus" coming out of the right AC arm a few 

days ago - none since. 





ER workup:


Initial differential diagnosis includes influenza, urinary tract infection, 

pneumonia. on arrival 37.8-132-38 B/P 10-/52 O2 sat 97% O2 at 2l





Labs- CBC WBC 15.4, hgb 12.2, plt 407 Chemistries- Na+130 K+Cl 9.8, anion gap 

12.5, BUN 7, Cr 0.8, glucose 111, VBG 7.459, Procalcitonin 0.22, , CRP 

17.55, , urine negative WBC, RBC, Nitrate, urine hcg negative-has IUD in 

place.


Influenza A & B is negative 





Imaging: Chest x-ray show right infiltrate. CT chest pulmonary angiogram with 

IV contrast- no evidence for pulmonary embolus. right middle and right lower 

lobe opacities consistent with atelectasis or pneumonia. pleura and pericardium 

no effusions. chest wall and axilla no mass or adenopathy Bones no significant 

findings Upper abdomen unremarkable. Impression no evidence for pulmonary 

embolus, right middle and right lower lobe opacities consistent with 

atelectasis versus pneumonia. 





ER medications given IV Normal Saline 2 liters, IV Piperacillin/Tazobactam 4.5 

mg. pending dose of IV Vancomycin 1 gram 





Last Recorded V/S: 37.8-123-38 oxygen sat. 97% NC 2 liter





Pneumonia with sepsis: 


-Admit to [address] [place] [address] for further monitoring- consulted with Internal Medicine 

Hospitalist for direction of care.


-IV Normal Saline flluid rate at 125ml/hr.


-IV Antibiotic Piperacillin Tazobactam 4.5 gram every  2 gram IV given in ER 

then 1 gram IV every 24 hours


-IV Antibiotic Vancomycin 1 gram now pharmcy to dose     


-oxygen to keep saturations greater than 95%


-IV Solumedrol 125 mg once, then IV 40 mg every 8 hours.


-Albuterol nebulizer treatments prn


-Duo Nebulizer treatment scheduled                


-Advise to notify nurses of any chest pain or other symptoms


-blood cultures x2 pending





IV Drug use - Meth last used 0900 on 4/24 and Heroin last use 2 days ago. 

Injection sites noted to bilateral AC and radial - which are hard and black. no 

discharge or bogginess is noted. Urine drug screen positive meth, amph, MDMA, 

THC


-Ativan 1 mg every 4 hours as needed for agitation





Tobacco use - smokes approximately half a pack per day


-Nicotine patch 17 mg daily





Maintenance issues


-no chronic medication


-Nutrition- regular diet


-Braun catheter -not indicated at this time


-DVT: Lovenox 30 mg subcut daily


-PPI; IV Protonix 40mg daily





CODE STATUS: FULL





Admission status: Admit to [address] [place] [address]





Admission justification.  This patient will be admitted for inpatient services 

and is medically appropriate meeting medical necessity for inpatient admission 

as outlined in my documentation.


I reasonably expect the patient will require inpatient services that span.  

Time over 2 midnights.  I reasonably expect this patient to be discharged or 

transferred within 96 hours after admission to the critical access hospital.





Disposition: home with Family





Primary care provider: [facility]





Hospitalist: Dr. Chacon











- Mortality Measure


Prognosis:: Good

## 2020-04-25 RX ADMIN — TAZOBACTAM SODIUM AND PIPERACILLIN SODIUM SCH MLS/HR: 375; 3 INJECTION, SOLUTION INTRAVENOUS at 13:03

## 2020-04-25 RX ADMIN — GUAIFENESIN AND CODEINE PHOSPHATE PRN ML: 10; 100 LIQUID ORAL at 08:21

## 2020-04-25 RX ADMIN — ALBUTEROL SULFATE PRN MG: 2.5 SOLUTION RESPIRATORY (INHALATION) at 22:14

## 2020-04-25 RX ADMIN — GUAIFENESIN AND CODEINE PHOSPHATE PRN ML: 10; 100 LIQUID ORAL at 18:28

## 2020-04-25 RX ADMIN — GUAIFENESIN AND CODEINE PHOSPHATE PRN ML: 10; 100 LIQUID ORAL at 22:50

## 2020-04-25 RX ADMIN — TAZOBACTAM SODIUM AND PIPERACILLIN SODIUM SCH MLS/HR: 375; 3 INJECTION, SOLUTION INTRAVENOUS at 05:14

## 2020-04-25 RX ADMIN — TAZOBACTAM SODIUM AND PIPERACILLIN SODIUM SCH MLS/HR: 375; 3 INJECTION, SOLUTION INTRAVENOUS at 18:22

## 2020-04-25 RX ADMIN — TAZOBACTAM SODIUM AND PIPERACILLIN SODIUM SCH MLS/HR: 375; 3 INJECTION, SOLUTION INTRAVENOUS at 23:46

## 2020-04-25 NOTE — PCM.PN
- General Info


Date of Service: 04/25/20


Subjective Update: 





No acute events overnight.  All 4 of her blood cultures have returned positive 

for gram-positive cocci but identification is still pending.  Heart rate is 

slowly trending down but remains mildly tachycardic.  She feels a little less 

short of breath today.  Her pleuritic type chest pain is a little better but 

still moderate.  No nausea.  Cough seems a little better.  Heart rate does 

climb to 130-140 range when she is up and moving around.


Functional Status: Reports: Pain Controlled, Tolerating Diet





- Review of Systems


General: Reports: Weakness.  Denies: Fever


Pulmonary: Reports: Shortness of Breath, Pleuritic Chest Pain, Cough


Gastrointestinal: Denies: Nausea





- Patient Data


Vitals - Most Recent: 


 Last Vital Signs











Temp  35.5 C L  04/25/20 07:26


 


Pulse  74   04/25/20 07:26


 


Resp  16   04/25/20 07:26


 


BP  81/31 L  04/25/20 07:26


 


Pulse Ox  93 L  04/25/20 07:26











Weight - Most Recent: 81.647 kg


I&O - Last 24 Hours: 


 Intake & Output











 04/24/20 04/25/20 04/25/20





 22:59 06:59 14:59


 


Intake Total 1102 1235 


 


Balance 1102 1235 











Lab Results Last 24 Hours: 


 Laboratory Results - last 24 hr











  04/25/20 04/25/20 Range/Units





  05:30 05:30 


 


WBC  11.0   (4.5-11.0)  K/uL


 


RBC  3.68   (3.30-5.50)  M/uL


 


Hgb  9.6 L D   (12.0-15.0)  g/dL


 


Hct  31.1 L   (36.0-48.0)  %


 


MCV  85   (80-98)  fL


 


MCH  26 L   (27-31)  pg


 


MCHC  31 L   (32-36)  %


 


Plt Count  346   (150-400)  K/uL


 


Sodium   138 L  (140-148)  mmol/L


 


Potassium   3.6  (3.6-5.2)  mmol/L


 


Chloride   105  (100-108)  mmol/L


 


Carbon Dioxide   24  (21-32)  mmol/L


 


Anion Gap   12.6  (5.0-14.0)  mmol/L


 


BUN   11  D  (7-18)  mg/dL


 


Creatinine   0.7  (0.6-1.0)  mg/dL


 


Est Cr Clr Drug Dosing   109.78  mL/min


 


Estimated GFR (MDRD)   > 60  (>60)  


 


Glucose   147 H  ()  mg/dL


 


Calcium   7.7 L  (8.5-10.1)  mg/dL











Kamlesh Results Last 24 Hours: 


 Microbiology











 04/24/20 03:52 Aerobic Blood Culture - Preliminary





 Blood - Arm, Right Anaerobic Blood Culture - Preliminary





    Gram Positive Cocci


 


 04/24/20 03:45 Aerobic Blood Culture - Preliminary





 Blood - Arm, Right Anaerobic Blood Culture - Preliminary


 


 04/24/20 08:39 Gram Stain - Final





 Sputum - Expectorated Respiratory Culture - Preliminary


 


 04/24/20 06:16 Influenza Type A Antigen Screen - Final





 Nasal, Unspecified    NEGATIVE INFLUENZA A VIRUS AG





    REFERENCE RANGE: NEGATIVE





 Influenza Type B Antigen Screen - Final





    NEGATIVE INFLUENZA B VIRUS AG





    REFERENCE RANGE: NEGATIVE











Med Orders - Current: 


 Current Medications





Acetaminophen (Tylenol)  650 mg PO Q4H PRN


   PRN Reason: Pain (Mild 1-3)/fever


Albuterol (Proventil Neb Soln)  2.5 mg NEB Q4H PRN


   PRN Reason: Shortness Of Breath/wheezing


   Last Admin: 04/24/20 20:17 Dose:  2.5 mg


Bisacodyl (Dulcolax)  5 mg PO DAILY PRN


   PRN Reason: Constipation


Docusate Sodium (Colace)  100 mg PO BID PRN


   PRN Reason: Constipation


Enoxaparin Sodium (Lovenox)  30 mg SUBCUT DAILY [facility]


   Last Admin: 04/25/20 08:22 Dose:  30 mg


Guaifenesin/Codeine Phosphate (Robitussin Ac)  10 ml PO Q4H PRN


   PRN Reason: Cough


   Last Admin: 04/25/20 08:21 Dose:  10 ml


Piperacillin/Tazobactam/ (Dextrose 3.375 gm/ Premix)  50 mls @ 100 mls/hr IV 

Q6H [facility]


   Last Admin: 04/25/20 05:14 Dose:  100 mls/hr


Sodium Chloride (Normal Saline)  1,000 mls @ 75 mls/hr IV ASDIRECTED [facility]


   Last Admin: 04/25/20 01:35 Dose:  75 mls/hr


Vancomycin HCl 1.25 gm/ Sodium (Chloride)  250 mls @ 150 mls/hr IV Q12H [facility]


Ketorolac Tromethamine (Toradol)  30 mg IVPUSH Q6H PRN


   PRN Reason: Pain (moderate 4-6)


   Stop: 04/29/20 10:57


Levofloxacin (Levaquin)  750 mg PO Q24H [facility]


   Last Admin: 04/25/20 08:22 Dose:  750 mg


Lorazepam (Ativan)  1 mg IV Q4H PRN


   PRN Reason: Agitation


   Last Admin: 04/24/20 08:23 Dose:  1 mg


Lorazepam (Ativan)  1 mg PO Q4H PRN


   PRN Reason: Anxiety


   Last Admin: 04/25/20 08:21 Dose:  1 mg


Nicotine (Habitrol)  14 mg TRDERM DAILY [facility]


   Last Admin: 04/25/20 08:21 Dose:  14 mg


Ondansetron HCl (Zofran Odt)  4 mg PO Q6H PRN


   PRN Reason: Nausea able to take PO


Ondansetron HCl (Zofran)  4 mg IV Q4H PRN


   PRN Reason: Nausea/Vomiting


Oxycodone HCl (Oxycodone)  10 mg PO Q4H PRN


   PRN Reason: Pain (moderate 4-6)


   Last Admin: 04/25/20 08:21 Dose:  10 mg


Pantoprazole Sodium (Protonix Iv***)  40 mg IVPUSH DAILY [facility]


   Last Admin: 04/25/20 08:23 Dose:  40 mg





Discontinued Medications





Acetaminophen (Tylenol)  650 mg PO NOW ONE


   Stop: 04/24/20 04:05


   Last Admin: 04/24/20 04:07 Dose:  650 mg


Albuterol/Ipratropium (Duoneb 3.0-0.5 Mg/3 Ml)  3 ml NEB QID [facility]


   Last Admin: 04/24/20 11:11 Dose:  Not Given


Albuterol/Ipratropium (Duoneb 3.0-0.5 Mg/3 Ml)  3 ml NEB QIDRT [facility]


   Last Admin: 04/24/20 10:40 Dose:  3 ml


Hydromorphone HCl (Dilaudid)  0.5 mg IVPUSH ONETIME ONE


   Stop: 04/24/20 04:27


   Last Admin: 04/24/20 04:30 Dose:  0.5 mg


Hydromorphone HCl (Dilaudid)  0.5 mg IVPUSH ONETIME ONE


   Stop: 04/24/20 05:04


   Last Admin: 04/24/20 05:13 Dose:  0.5 mg


Sodium Chloride (Normal Saline)  1,000 mls @ 999 mls/hr IV ASDIRECTED [facility]


   Last Admin: 04/24/20 03:43 Dose:  999 mls/hr


Sodium Chloride (Normal Saline)  1,000 mls @ 999 mls/hr IV ASDIRECTED [facility]


   Last Admin: 04/24/20 04:40 Dose:  999 mls/hr


Piperacillin Sod/Tazobactam (Sod 4.5 gm/ Sodium Chloride)  100 mls @ 100 mls/hr 

IV ONETIME ONE


   Stop: 04/24/20 05:07


   Last Admin: 04/24/20 04:32 Dose:  100 mls/hr


Sodium Chloride (Normal Saline) Confirm Administered Dose 100 mls @ as directed 

.ROUTE .STK-MED ONE


   Stop: 04/24/20 04:19


   Last Admin: 04/24/20 04:31 Dose:  Not Given


Sodium Chloride (Normal Saline)  90 mls @ 3 mls/sec IV ONETIME ONE


   Stop: 04/24/20 04:31


   Last Admin: 04/24/20 04:59 Dose:  3 mls/sec


Piperacillin/Tazobactam/ (Dextrose 4.5 gm/ Premix)  100 mls @ 200 mls/hr IV Q6H 

[facility]


   Last Admin: 04/24/20 12:03 Dose:  200 mls/hr


Sodium Chloride (Normal Saline)  1,000 mls @ 125 mls/hr IV ASDIRECTED [facility]


   Last Admin: 04/24/20 06:31 Dose:  125 mls/hr


Vancomycin HCl 1 gm/ Sodium (Chloride)  250 mls @ 166.667 mls/hr IV ONETIME ONE


   Stop: 04/24/20 08:29


   Last Admin: 04/24/20 07:56 Dose:  166.667 mls/hr


Vancomycin HCl 1.2 gm/ Sodium (Chloride)  250 mls @ 170 mls/hr IV Q12H [facility]


Sodium Chloride (Normal Saline)  500 mls @ 500 mls/hr IV ASDIRECTED [facility]


   Stop: 04/24/20 12:46


Iopamidol (Isovue-370 (76%))  75 ml IV .AS DIRECTED [facility]


   Last Admin: 04/24/20 04:59 Dose:  75 ml


Lorazepam (Ativan)  0.5 mg IVPUSH ONETIME ONE


   Stop: 04/24/20 04:18


   Last Admin: 04/24/20 04:31 Dose:  0.5 mg


Methylprednisolone Sodium Succinate (Solu-Medrol)  125 mg IM ONETIME ONE


   Stop: 04/24/20 08:01


   Last Admin: 04/24/20 08:26 Dose:  Not Given


Methylprednisolone Sodium Succinate (Solu-Medrol)  40 mg IVPUSH Q8H [facility]


Methylprednisolone Sodium Succinate (Solu-Medrol)  125 mg IVPUSH ONETIME ONE


   Stop: 04/24/20 08:16


   Last Admin: 04/24/20 08:25 Dose:  125 mg


Sodium Chloride (Saline Flush)  10 ml FLUSH ONETIME PRN


   PRN Reason: PER RADIOLOGY PROTOCOL


Vancomycin HCl (Vancomycin)  1 gm IV .PHARMACY TO DOSE [facility]











- Exam


Quality Assessment: No: Supplemental Oxygen


General: Alert, Oriented, Cooperative, Mild Distress


Lungs: Normal Respiratory Effort, Crackles (rare right lung base).  No: Rhonchi

, Wheezing


Cardiovascular: Regular Rhythm, Tachycardia


GI/Abdominal Exam: Soft, No Distention


Extremities: No Pedal Edema


Psy/Mental Status: Alert, Normal Affect





Sepsis Event Note





- Evaluation


Sepsis Screening Result: No Definite Risk





- Focused Exam


Vital Signs: 


 Vital Signs











  Temp Pulse Resp BP Pulse Ox


 


 04/25/20 07:26  35.5 C L  74  16  81/31 L  93 L


 


 04/25/20 03:00  34.7 C L  102 H  36 H  95/42 L  95


 


 04/25/20 01:00      96


 


 04/24/20 23:00  35.1 C L  123 H  52 H  104/41 L  97











Date Exam was Performed: 04/25/20


Time Exam was Performed: 10:37





- Problem List Review


Problem List Initiated/Reviewed/Updated: Yes





- My Orders


Last 24 Hours: 


My Active Orders





04/24/20 09:00


levoFLOXacin [Levaquin]   750 mg PO Q24H 





04/24/20 10:56


LORazepam [Ativan]   1 mg PO Q4H PRN 





04/24/20 10:57


Ketorolac [Toradol]   30 mg IVPUSH Q6H PRN 





04/24/20 14:15


Sodium Chloride 0.9% [Normal Saline] 1,000 ml IV ASDIRECTED 





04/24/20 18:00


Piperacillin/Tazobactam/Dext [Zosyn in Dextrose Iso-Osmotic 3.375 GM] 3.375 gm 

  Premix Bag 1 bag IV Q6H 





04/25/20 09:28


Potassium Chloride [Klor-Con M20]   40 meq PO ONETIME ONE 





04/25/20 09:45


Sodium Chloride 0.9% [Normal Saline] 1,000 ml IV ASDIRECTED 





04/25/20 10:00


Vancomycin 1.25 gm   Sodium Chloride 0.9% [Normal Saline] 250 ml IV Q12H 





04/26/20 05:00


BASIC METABOLIC PANEL,BMP [CHEM] Timed 


CBC W/O DIFF,HEMOGRAM [HEME] Timed (1) 


CULTURE BLOOD [BC] Timed 














- Plan


Plan:: 





ASSESSMENT AND PLAN-





Pneumonia with sepsis-sepsis has resolved.  All 4 blood cultures are growing 

gram-positive cocci but identification is still pending.  At this point the 

suspicion is the bacteremia is coming from her pneumonia but endocarditis is 

still in the differential.  Hopefully determining the organism will help decide 

the source.


-Antibiotic coverage with vancomycin, Pip/Tazo and levofloxacin


-Supplement oxygen as needed


-Albuterol nebulizer treatments prn


-Symptomatic management of pain


-Follow-up cultures





IV Drug abuse-she has injected both methamphetamines and heroin recently.  No 

significant evidence for withdrawal or agitation at this time.


-Lorazepam as needed for agitation


-Offer resources for cessation





Tobacco dependence-she will need encouragement regarding cessation.


-Nicotine patch 17 mg daily





Maintenance issues


-Nutrition- regular diet


-Braun catheter -not indicated at this time


-DVT: Lovenox 30 mg subcut daily


-GI; not indicated





Disposition-I anticipate discharge home after the hospital stay





Primary care provider: [facility]





Zoran Chacon MD

## 2020-04-26 RX ADMIN — TAZOBACTAM SODIUM AND PIPERACILLIN SODIUM SCH MLS/HR: 375; 3 INJECTION, SOLUTION INTRAVENOUS at 05:16

## 2020-04-26 NOTE — PCM.PN
- General Info


Date of Service: 04/26/20


Subjective Update: 





There were no acute events overnight.  She reports persistent to may be mildly 

improved pleuritic type pain in her chest that radiates through to her back.  

She still is short of breath with activity.  She has been in bed most of the 

time other than trips to the bathroom.  No nausea or abdominal pain.  No 

fevers.  Still mildly tachycardic.  Blood cultures have all returned with MSSA.


Functional Status: Reports: Pain Controlled, Tolerating Diet





- Review of Systems


General: Denies: Fever


Cardiovascular: Reports: Chest Pain


Musculoskeletal: Reports: Back Pain





- Patient Data


Vitals - Most Recent: 


 Last Vital Signs











Temp  37.7 C   04/26/20 07:36


 


Pulse  109 H  04/26/20 07:36


 


Resp  16   04/26/20 07:36


 


BP  119/74   04/26/20 07:36


 


Pulse Ox  96   04/26/20 07:36











Weight - Most Recent: 81.647 kg


I&O - Last 24 Hours: 


 Intake & Output











 04/25/20 04/26/20 04/26/20





 22:59 06:59 14:59


 


Intake Total 2536 301 1090


 


Balance 2536 301 1090











Lab Results Last 24 Hours: 


 Laboratory Results - last 24 hr











  04/26/20 04/26/20 Range/Units





  04:40 04:40 


 


WBC  7.2   (4.5-11.0)  K/uL


 


RBC  3.95   (3.30-5.50)  M/uL


 


Hgb  10.5 L   (12.0-15.0)  g/dL


 


Hct  33.7 L   (36.0-48.0)  %


 


MCV  85   (80-98)  fL


 


MCH  27   (27-31)  pg


 


MCHC  31 L   (32-36)  %


 


Plt Count  361   (150-400)  K/uL


 


Sodium   136 L  (140-148)  mmol/L


 


Potassium   3.4 L  (3.6-5.2)  mmol/L


 


Chloride   104  (100-108)  mmol/L


 


Carbon Dioxide   24  (21-32)  mmol/L


 


Anion Gap   11.4  (5.0-14.0)  mmol/L


 


BUN   7  (7-18)  mg/dL


 


Creatinine   0.7  (0.6-1.0)  mg/dL


 


Est Cr Clr Drug Dosing   109.78  mL/min


 


Estimated GFR (MDRD)   > 60  (>60)  


 


Glucose   137 H  ()  mg/dL


 


Calcium   7.6 L  (8.5-10.1)  mg/dL











Kamlesh Results Last 24 Hours: 


 Microbiology











 04/24/20 03:52 Aerobic Blood Culture - Final





 Blood - Arm, Right    Staphylococcus Aureus





 Anaerobic Blood Culture - Final





    Staphylococcus Aureus


 


 04/24/20 03:45 Aerobic Blood Culture - Final





 Blood - Arm, Right    Staphylococcus Aureus





 Anaerobic Blood Culture - Final





    Staphylococcus Aureus


 


 04/24/20 08:39 Gram Stain - Final





 Sputum - Expectorated Respiratory Culture - Final











Med Orders - Current: 


 Current Medications





Acetaminophen (Tylenol)  650 mg PO Q4H PRN


   PRN Reason: Pain (Mild 1-3)/fever


Albuterol (Proventil Neb Soln)  2.5 mg NEB Q4H PRN


   PRN Reason: Shortness Of Breath/wheezing


   Last Admin: 04/25/20 22:14 Dose:  2.5 mg


Bisacodyl (Dulcolax)  5 mg PO DAILY PRN


   PRN Reason: Constipation


Docusate Sodium (Colace)  100 mg PO BID PRN


   PRN Reason: Constipation


Enoxaparin Sodium (Lovenox)  30 mg SUBCUT DAILY [facility]


   Last Admin: 04/25/20 08:22 Dose:  30 mg


Guaifenesin/Codeine Phosphate (Robitussin Ac)  10 ml PO Q4H PRN


   PRN Reason: Cough


   Last Admin: 04/25/20 22:50 Dose:  10 ml


Sodium Chloride (Normal Saline)  1,000 mls @ 25 mls/hr IV ASDIRECTED [facility]


   Last Admin: 04/26/20 02:49 Dose:  25 mls/hr


Cefazolin Sodium 2 gm/ Sodium (Chloride)  50 mls @ 200 mls/hr IV Q8HR [facility]


Ketorolac Tromethamine (Toradol)  30 mg IVPUSH Q6H PRN


   PRN Reason: Pain (moderate 4-6)


   Stop: 04/29/20 10:57


Lorazepam (Ativan)  1 mg IV Q4H PRN


   PRN Reason: Agitation


   Last Admin: 04/24/20 08:23 Dose:  1 mg


Lorazepam (Ativan)  1 mg PO Q4H PRN


   PRN Reason: Anxiety


   Last Admin: 04/25/20 22:50 Dose:  1 mg


Nicotine (Habitrol)  14 mg TRDERM DAILY [facility]


   Last Admin: 04/26/20 09:08 Dose:  14 mg


Ondansetron HCl (Zofran Odt)  4 mg PO Q6H PRN


   PRN Reason: Nausea able to take PO


Ondansetron HCl (Zofran)  4 mg IV Q4H PRN


   PRN Reason: Nausea/Vomiting


Oxycodone HCl (Oxycodone)  10 mg PO Q4H PRN


   PRN Reason: Pain (moderate 4-6)


   Last Admin: 04/25/20 22:50 Dose:  10 mg


Pantoprazole Sodium (Protonix***)  40 mg PO ACBREAKFAST [facility]


   Last Admin: 04/26/20 09:08 Dose:  40 mg





Discontinued Medications





Acetaminophen (Tylenol)  650 mg PO NOW ONE


   Stop: 04/24/20 04:05


   Last Admin: 04/24/20 04:07 Dose:  650 mg


Albuterol/Ipratropium (Duoneb 3.0-0.5 Mg/3 Ml)  3 ml NEB QID [facility]


   Last Admin: 04/24/20 11:11 Dose:  Not Given


Albuterol/Ipratropium (Duoneb 3.0-0.5 Mg/3 Ml)  3 ml NEB QIDRT [facility]


   Last Admin: 04/24/20 10:40 Dose:  3 ml


Hydromorphone HCl (Dilaudid)  0.5 mg IVPUSH ONETIME ONE


   Stop: 04/24/20 04:27


   Last Admin: 04/24/20 04:30 Dose:  0.5 mg


Hydromorphone HCl (Dilaudid)  0.5 mg IVPUSH ONETIME ONE


   Stop: 04/24/20 05:04


   Last Admin: 04/24/20 05:13 Dose:  0.5 mg


Sodium Chloride (Normal Saline)  1,000 mls @ 999 mls/hr IV ASDIRECTED [facility]


   Last Admin: 04/24/20 03:43 Dose:  999 mls/hr


Sodium Chloride (Normal Saline)  1,000 mls @ 999 mls/hr IV ASDIRECTED [facility]


   Last Admin: 04/24/20 04:40 Dose:  999 mls/hr


Piperacillin Sod/Tazobactam (Sod 4.5 gm/ Sodium Chloride)  100 mls @ 100 mls/hr 

IV ONETIME ONE


   Stop: 04/24/20 05:07


   Last Admin: 04/24/20 04:32 Dose:  100 mls/hr


Sodium Chloride (Normal Saline) Confirm Administered Dose 100 mls @ as directed 

.ROUTE .STK-MED ONE


   Stop: 04/24/20 04:19


   Last Admin: 04/24/20 04:31 Dose:  Not Given


Sodium Chloride (Normal Saline)  90 mls @ 3 mls/sec IV ONETIME ONE


   Stop: 04/24/20 04:31


   Last Admin: 04/24/20 04:59 Dose:  3 mls/sec


Piperacillin/Tazobactam/ (Dextrose 4.5 gm/ Premix)  100 mls @ 200 mls/hr IV Q6H 

[facility]


   Last Admin: 04/24/20 12:03 Dose:  200 mls/hr


Sodium Chloride (Normal Saline)  1,000 mls @ 125 mls/hr IV ASDIRECTED [facility]


   Last Admin: 04/24/20 06:31 Dose:  125 mls/hr


Vancomycin HCl 1 gm/ Sodium (Chloride)  250 mls @ 166.667 mls/hr IV ONETIME ONE


   Stop: 04/24/20 08:29


   Last Admin: 04/24/20 07:56 Dose:  166.667 mls/hr


Vancomycin HCl 1.2 gm/ Sodium (Chloride)  250 mls @ 170 mls/hr IV Q12H [facility]


Sodium Chloride (Normal Saline)  500 mls @ 500 mls/hr IV ASDIRECTED [facility]


   Stop: 04/24/20 12:46


Piperacillin/Tazobactam/ (Dextrose 3.375 gm/ Premix)  50 mls @ 100 mls/hr IV 

Q6H [facility]


   Last Admin: 04/26/20 05:16 Dose:  100 mls/hr


Sodium Chloride (Normal Saline)  1,000 mls @ 75 mls/hr IV ASDIRECTED [facility]


   Last Admin: 04/25/20 01:35 Dose:  75 mls/hr


Vancomycin HCl 1.25 gm/ Sodium (Chloride)  250 mls @ 150 mls/hr IV Q12H [facility]


   Last Admin: 04/26/20 09:05 Dose:  150 mls/hr


Iopamidol (Isovue-370 (76%))  75 ml IV .AS DIRECTED [facility]


   Last Admin: 04/24/20 04:59 Dose:  75 ml


Levofloxacin (Levaquin)  750 mg PO Q24H [facility]


   Last Admin: 04/26/20 09:08 Dose:  750 mg


Lorazepam (Ativan)  0.5 mg IVPUSH ONETIME ONE


   Stop: 04/24/20 04:18


   Last Admin: 04/24/20 04:31 Dose:  0.5 mg


Methylprednisolone Sodium Succinate (Solu-Medrol)  125 mg IM ONETIME ONE


   Stop: 04/24/20 08:01


   Last Admin: 04/24/20 08:26 Dose:  Not Given


Methylprednisolone Sodium Succinate (Solu-Medrol)  40 mg IVPUSH Q8H [facility]


Methylprednisolone Sodium Succinate (Solu-Medrol)  125 mg IVPUSH ONETIME ONE


   Stop: 04/24/20 08:16


   Last Admin: 04/24/20 08:25 Dose:  125 mg


Pantoprazole Sodium (Protonix Iv***)  40 mg IVPUSH DAILY [facility]


   Last Admin: 04/25/20 08:23 Dose:  40 mg


Potassium Chloride (Klor-Con M20)  40 meq PO ONETIME ONE


   Stop: 04/25/20 10:01


   Last Admin: 04/25/20 11:11 Dose:  40 meq


Potassium Chloride (Klor-Con M20)  40 meq PO ONETIME ONE


   Stop: 04/26/20 09:01


   Last Admin: 04/26/20 09:08 Dose:  40 meq


Sodium Chloride (Saline Flush)  10 ml FLUSH ONETIME PRN


   PRN Reason: PER RADIOLOGY PROTOCOL


Vancomycin HCl (Vancomycin)  1 gm IV .PHARMACY TO DOSE [facility]











- Exam


Quality Assessment: No: Supplemental Oxygen


General: Alert, Oriented, Cooperative, Mild Distress


Neck: Supple


Lungs: Normal Respiratory Effort, Crackles (mild right mid lung )


Cardiovascular: Regular Rhythm, No Murmurs, Tachycardia


GI/Abdominal Exam: Soft, No Distention


Extremities: No Pedal Edema, Other (injection marks both AC fossa but no warmth

, fluctuance or erythema ).  No: Increased Warmth


Skin: Warm, Dry


Psy/Mental Status: Alert, Normal Affect





Sepsis Event Note





- Evaluation


Sepsis Screening Result: No Definite Risk





- Focused Exam


Vital Signs: 


 Vital Signs











  Temp Pulse Resp BP Pulse Ox


 


 04/26/20 07:36  37.7 C  109 H  16  119/74  96


 


 04/26/20 03:25  36.1 C  109 H  28 H  100/48 L  94 L


 


 04/25/20 22:42  36.2 C  119 H  20  107/46 L  96











Date Exam was Performed: 04/26/20


Time Exam was Performed: 10:21





- Problem List Review


Problem List Initiated/Reviewed/Updated: Yes





- My Orders


Last 24 Hours: 


My Active Orders





04/25/20 09:45


Sodium Chloride 0.9% [Normal Saline] 1,000 ml IV ASDIRECTED 





04/26/20 04:40


CULTURE BLOOD [BC] Routine 





04/26/20 09:00


Pantoprazole [ProTONIX***]   40 mg PO ACBREAKFAST 





04/26/20 14:00


ceFAZolin [Ancef] 2 gm   Sodium Chloride 0.9% [Normal Saline] 50 ml IV Q8HR 





04/27/20 05:00


BASIC METABOLIC PANEL,BMP [CHEM] Timed 


CBC W/O DIFF,HEMOGRAM [HEME] Timed (1) 





04/27/20 07:00


Echo Comp wo Cont [US] Routine 





04/27/20 09:30


VANCOMYCIN TROUGH [CHEM] Routine 














- Plan


Plan:: 





ASSESSMENT AND PLAN-





Pneumonia with sepsis-sepsis has resolved.  All 4 blood cultures are growing 

MSSA.  I am not overly impressed by the pneumonia as a source for her staph 

aureus bacteremia.  Endocarditis needs to be ruled out.  She does have some 

thoracic pain and vertebral osteomyelitis could be a consideration as well.  No 

evidence for injection site infection.  Sputum culture grew out normal 

respiratory kelsey.


-Antibiotic coverage with cefazolin (patient will need at least 2 weeks of IV 

antibiotics starting from 4/24)


-Echo in the morning to look for endocarditis


-Consider MRI of the thoracic spine if echo negative


-Supplement oxygen as needed


-Albuterol nebulizer treatments prn


-Symptomatic management of pain


-Follow-up cultures





IV Drug abuse-she has injected both methamphetamines and heroin recently.  No 

significant evidence for withdrawal or agitation at this time.


-Lorazepam as needed for agitation


-Offer resources for cessation





Tobacco dependence-she will need encouragement regarding cessation.


-Nicotine patch 14 mg daily





Maintenance issues


-Nutrition- regular diet


-Braun catheter -not indicated at this time


-DVT: Lovenox 30 mg subcut daily


-GI; not indicated





Disposition-I anticipate discharge home after the hospital stay





Primary care provider: [facility]





Zoran Chacon MD

## 2020-04-27 RX ADMIN — GUAIFENESIN AND CODEINE PHOSPHATE PRN ML: 10; 100 LIQUID ORAL at 03:29

## 2020-04-27 NOTE — CR
CHEST: Portable 4/24/2020 at 4:04 AM

 

CLINICAL HISTORY:Chest pain

 

COMPARISON:None

 

FINDINGS: There is less than optimal inspiration. There is patchy right lower

lobe opacification. The heart size and pulmonary vascularity are normal. There

are no effusions.

 

Impression: Limited portable chest

 

Patchy right lower lobe opacification most consistent with right lower lobe

infiltrate

 

.

## 2020-04-27 NOTE — PCM.DCSUM1
**Discharge Summary





- Hospital Course


Brief History: Levar is a 19-year-old woman who was admitted through the 

emergency department with weakness shortness of breath secondary to underlying 

pneumonia and sepsis.





- Discharge Data


Discharge Date: 04/27/20


Discharge Disposition: Home, Self-Care 01


Condition: Serious





- Referral to Home Health


Primary Care Physician: 


PCP None








- Discharge Diagnosis/Problem(s)


(1) IV drug abuse


SNOMED Code(s): 553721651, 249884909


   ICD Code: F19.10 - OTHER PSYCHOACTIVE SUBSTANCE ABUSE, UNCOMPLICATED   Status

: Acute   Priority: High   Current Visit: Yes   





(2) Sepsis


SNOMED Code(s): 16440573


   ICD Code: A41.9 - SEPSIS, UNSPECIFIED ORGANISM   Status: Acute   Current 

Visit: Yes   





(3) Pneumonia


SNOMED Code(s): 782674666


   ICD Code: J18.9 - PNEUMONIA, UNSPECIFIED ORGANISM   Status: Acute   Priority

: High   Current Visit: Yes   


Qualifiers: 


   Pneumonia type: due to unspecified organism 





- Patient Summary/Data


Hospital Course: 





Ms. Cristobal is a 19-year-old woman who was admitted through the emergency 

department with weakness and shortness of breath secondary to pneumonia.  She 

was started on antibiotic therapy on admission for community-acquired 

pneumonia.  Blood cultures grew out methicillin sensitive staph aureus, all 4 

bottles.  She was transitioned to IV vancomycin until sensitivities came back 

and then placed on cefazolin.  Follow-up blood cultures obtained 2 days after 

the original pair showed no growth.  White blood cell count was normal on 

admission and remained within normal range through her hospital stay.  She had 

reported feeling somewhat better over the few days of her hospitalization.  We 

were developing a plan for a 2-week course of IV antibiotic therapy because of 

the underlying staph aureus.  Echocardiogram was obtained on the day of 

discharge, the results are pending at the time of this dictation.  On the 

morning of discharge the patient stated that she was leaving and had no 

intention of doing IV antibiotics.  I did review with her the very serious 

nature of her current findings and situation.  She understands that she has a 

very serious health problem that has not been adequately treated.  Despite this 

explanation she is adamant about leaving today.  Rather than have her sign out 

AMA we did get her to agree to accept oral antibiotic therapy and we will pick 

up the prescription for her so she has it prior to discharge.  Understands that 

I have recommended IV antibiotic therapy rather than oral as well as further 

evaluation to look for other potential causes of her staph aureus sepsis.  We 

will attempt to schedule follow-up appointment with her primary care provider.  

Activity should be as tolerated and she should resume her usual diet.





- Patient Instructions


Diet: Usual Diet as Tolerated


Activity: As Tolerated


Other/Special Instructions: Schedule follow-up appointment with primary care 

provider within 1 week.





- Discharge Plan


*PRESCRIPTION DRUG MONITORING PROGRAM REVIEWED*: Not Applicable


*COPY OF PRESCRIPTION DRUG MONITORING REPORT IN PATIENT JONA: Not Applicable


Prescriptions/Med Rec: 


Lactobacillus Rhamnosus GG [Culturelle] 1 cap PO BID #60 cap


Sulfamethoxazole/Trimethoprim [Septra DS] 1 each PO BID #28 tab


Home Medications: 


 Home Meds





Lactobacillus Rhamnosus GG [Culturelle] 1 cap PO BID #60 cap 04/27/20 [Rx]


Sulfamethoxazole/Trimethoprim [Septra DS] 1 each PO BID #28 tab 04/27/20 [Rx]











- Discharge Summary/Plan Comment


DC Time >30 min.: No





- Patient Data


Vitals - Most Recent: 


 Last Vital Signs











Temp  98.0 F   04/27/20 06:53


 


Pulse  106 H  04/27/20 06:53


 


Resp  22 H  04/27/20 06:53


 


BP  104/52 L  04/27/20 06:53


 


Pulse Ox  94 L  04/27/20 06:53











Weight - Most Recent: 180 lb


I&O - Last 24 hours: 


 Intake & Output











 04/26/20 04/27/20 04/27/20





 22:59 06:59 14:59


 


Intake Total [phone]


 


Balance [phone]











Lab Results - Last 24 hrs: 


 Laboratory Results - last 24 hr











  04/27/20 04/27/20 04/27/20 Range/Units





  04:10 04:10 09:28 


 


WBC  10.6    (4.5-11.0)  K/uL


 


RBC  4.63    (3.30-5.50)  M/uL


 


Hgb  12.2    (12.0-15.0)  g/dL


 


Hct  38.9    (36.0-48.0)  %


 


MCV  84    (80-98)  fL


 


MCH  26 L    (27-31)  pg


 


MCHC  31 L    (32-36)  %


 


Plt Count  481 H    (150-400)  K/uL


 


Sodium   135 L   (140-148)  mmol/L


 


Potassium   4.0   (3.6-5.2)  mmol/L


 


Chloride   100   (100-108)  mmol/L


 


Carbon Dioxide   27   (21-32)  mmol/L


 


Anion Gap   12.0   (5.0-14.0)  mmol/L


 


BUN   7   (7-18)  mg/dL


 


Creatinine   0.7   (0.6-1.0)  mg/dL


 


Est Cr Clr Drug Dosing   109.78   mL/min


 


Estimated GFR (MDRD)   > 60   (>60)  


 


Glucose   98   ()  mg/dL


 


Calcium   8.4 L   (8.5-10.1)  mg/dL


 


Vancomycin Trough    0.8 L  (10.0-20.0)  ug/mL











GEETA Results - Last 24 hrs: 


 Microbiology











 04/26/20 04:40 Aerobic Blood Culture - Preliminary





 Blood - Arm, Right    NO GROWTH AFTER 1 DAY





 Anaerobic Blood Culture - Preliminary





    NO GROWTH AFTER 1 DAY


 


 04/24/20 03:52 Aerobic Blood Culture - Final





 Blood - Arm, Right    Staphylococcus Aureus





 Anaerobic Blood Culture - Final





    Staphylococcus Aureus


 


 04/24/20 03:45 Aerobic Blood Culture - Final





 Blood - Arm, Right    Staphylococcus Aureus





 Anaerobic Blood Culture - Final





    Staphylococcus Aureus


 


 04/24/20 08:39 Gram Stain - Final





 Sputum - Expectorated Respiratory Culture - Final











Med Orders - Current: 


 Current Medications





Acetaminophen (Tylenol)  650 mg PO Q4H PRN


   PRN Reason: Pain (Mild 1-3)/fever


Albuterol (Proventil Neb Soln)  2.5 mg NEB Q4H PRN


   PRN Reason: Shortness Of Breath/wheezing


   Last Admin: 04/25/20 22:14 Dose:  2.5 mg


Bisacodyl (Dulcolax)  5 mg PO DAILY PRN


   PRN Reason: Constipation


Docusate Sodium (Colace)  100 mg PO BID PRN


   PRN Reason: Constipation


Enoxaparin Sodium (Lovenox)  30 mg SUBCUT DAILY [facility]


   Last Admin: 04/26/20 09:30 Dose:  30 mg


Guaifenesin/Codeine Phosphate (Robitussin Ac)  10 ml PO Q4H PRN


   PRN Reason: Cough


   Last Admin: 04/27/20 03:29 Dose:  10 ml


Sodium Chloride (Normal Saline)  1,000 mls @ 25 mls/hr IV ASDIRECTED [facility]


   Last Admin: 04/26/20 02:49 Dose:  25 mls/hr


Cefazolin Sodium/Dextrose 2 gm (/ Premix)  50 mls @ 200 mls/hr IV Q8H [facility]


   Last Admin: 04/27/20 05:37 Dose:  200 mls/hr


Ketorolac Tromethamine (Toradol)  30 mg IVPUSH Q6H PRN


   PRN Reason: Pain (moderate 4-6)


   Stop: 04/29/20 10:57


Lorazepam (Ativan)  1 mg IV Q4H PRN


   PRN Reason: Agitation


   Last Admin: 04/24/20 08:23 Dose:  1 mg


Lorazepam (Ativan)  1 mg PO Q4H PRN


   PRN Reason: Anxiety


   Last Admin: 04/27/20 03:29 Dose:  1 mg


Nicotine (Habitrol)  14 mg TRDERM DAILY [facility]


   Last Admin: 04/26/20 09:08 Dose:  14 mg


Ondansetron HCl (Zofran Odt)  4 mg PO Q6H PRN


   PRN Reason: Nausea able to take PO


Ondansetron HCl (Zofran)  4 mg IV Q4H PRN


   PRN Reason: Nausea/Vomiting


Oxycodone HCl (Oxycodone)  10 mg PO Q4H PRN


   PRN Reason: Pain (moderate 4-6)


   Last Admin: 04/27/20 03:28 Dose:  10 mg


Pantoprazole Sodium (Protonix***)  40 mg PO ACBREAKFAST [facility]


   Last Admin: 04/27/20 07:49 Dose:  40 mg





Discontinued Medications





Acetaminophen (Tylenol)  650 mg PO NOW ONE


   Stop: 04/24/20 04:05


   Last Admin: 04/24/20 04:07 Dose:  650 mg


Albuterol/Ipratropium (Duoneb 3.0-0.5 Mg/3 Ml)  3 ml NEB QID [facility]


   Last Admin: 04/24/20 11:11 Dose:  Not Given


Albuterol/Ipratropium (Duoneb 3.0-0.5 Mg/3 Ml)  3 ml NEB QIDRT [facility]


   Last Admin: 04/24/20 10:40 Dose:  3 ml


Hydromorphone HCl (Dilaudid)  0.5 mg IVPUSH ONETIME ONE


   Stop: 04/24/20 04:27


   Last Admin: 04/24/20 04:30 Dose:  0.5 mg


Hydromorphone HCl (Dilaudid)  0.5 mg IVPUSH ONETIME ONE


   Stop: 04/24/20 05:04


   Last Admin: 04/24/20 05:13 Dose:  0.5 mg


Sodium Chloride (Normal Saline)  1,000 mls @ 999 mls/hr IV ASDIRECTED [facility]


   Last Admin: 04/24/20 03:43 Dose:  999 mls/hr


Sodium Chloride (Normal Saline)  1,000 mls @ 999 mls/hr IV ASDIRECTED [facility]


   Last Admin: 04/24/20 04:40 Dose:  999 mls/hr


Piperacillin Sod/Tazobactam (Sod 4.5 gm/ Sodium Chloride)  100 mls @ 100 mls/hr 

IV ONETIME ONE


   Stop: 04/24/20 05:07


   Last Admin: 04/24/20 04:32 Dose:  100 mls/hr


Sodium Chloride (Normal Saline) Confirm Administered Dose 100 mls @ as directed 

.ROUTE .STK-MED ONE


   Stop: 04/24/20 04:19


   Last Admin: 04/24/20 04:31 Dose:  Not Given


Sodium Chloride (Normal Saline)  90 mls @ 3 mls/sec IV ONETIME ONE


   Stop: 04/24/20 04:31


   Last Admin: 04/24/20 04:59 Dose:  3 mls/sec


Piperacillin/Tazobactam/ (Dextrose 4.5 gm/ Premix)  100 mls @ 200 mls/hr IV Q6H 

[facility]


   Last Admin: 04/24/20 12:03 Dose:  200 mls/hr


Sodium Chloride (Normal Saline)  1,000 mls @ 125 mls/hr IV ASDIRECTED [facility]


   Last Admin: 04/24/20 06:31 Dose:  125 mls/hr


Vancomycin HCl 1 gm/ Sodium (Chloride)  250 mls @ 166.667 mls/hr IV ONETIME ONE


   Stop: 04/24/20 08:29


   Last Admin: 04/24/20 07:56 Dose:  166.667 mls/hr


Vancomycin HCl 1.2 gm/ Sodium (Chloride)  250 mls @ 170 mls/hr IV Q12H [facility]


Sodium Chloride (Normal Saline)  500 mls @ 500 mls/hr IV ASDIRECTED [facility]


   Stop: 04/24/20 12:46


Piperacillin/Tazobactam/ (Dextrose 3.375 gm/ Premix)  50 mls @ 100 mls/hr IV 

Q6H [facility]


   Last Admin: 04/26/20 05:16 Dose:  100 mls/hr


Sodium Chloride (Normal Saline)  1,000 mls @ 75 mls/hr IV ASDIRECTED [facility]


   Last Admin: 04/25/20 01:35 Dose:  75 mls/hr


Vancomycin HCl 1.25 gm/ Sodium (Chloride)  250 mls @ 150 mls/hr IV Q12H [facility]


   Last Admin: 04/26/20 09:05 Dose:  150 mls/hr


Iopamidol (Isovue-370 (76%))  75 ml IV .AS DIRECTED [facility]


   Last Admin: 04/24/20 04:59 Dose:  75 ml


Levofloxacin (Levaquin)  750 mg PO Q24H [facility]


   Last Admin: 04/26/20 09:08 Dose:  750 mg


Lorazepam (Ativan)  0.5 mg IVPUSH ONETIME ONE


   Stop: 04/24/20 04:18


   Last Admin: 04/24/20 04:31 Dose:  0.5 mg


Methylprednisolone Sodium Succinate (Solu-Medrol)  125 mg IM ONETIME ONE


   Stop: 04/24/20 08:01


   Last Admin: 04/24/20 08:26 Dose:  Not Given


Methylprednisolone Sodium Succinate (Solu-Medrol)  40 mg IVPUSH Q8H [facility]


Methylprednisolone Sodium Succinate (Solu-Medrol)  125 mg IVPUSH ONETIME ONE


   Stop: 04/24/20 08:16


   Last Admin: 04/24/20 08:25 Dose:  125 mg


Pantoprazole Sodium (Protonix Iv***)  40 mg IVPUSH DAILY [facility]


   Last Admin: 04/25/20 08:23 Dose:  40 mg


Potassium Chloride (Klor-Con M20)  40 meq PO ONETIME ONE


   Stop: 04/25/20 10:01


   Last Admin: 04/25/20 11:11 Dose:  40 meq


Potassium Chloride (Klor-Con M20)  40 meq PO ONETIME ONE


   Stop: 04/26/20 09:01


   Last Admin: 04/26/20 09:08 Dose:  40 meq


Sodium Chloride (Saline Flush)  10 ml FLUSH ONETIME PRN


   PRN Reason: PER RADIOLOGY PROTOCOL


Vancomycin HCl (Vancomycin)  1 gm IV .PHARMACY TO DOSE [facility]











- Exam


General: Reports: Alert, Oriented, Mild Distress.  Denies: Cooperative


Lungs: Reports: Clear to Auscultation, Normal Respiratory Effort


Cardiovascular: Reports: Regular Rhythm, No Murmurs, Tachycardia


GI/Abdominal Exam: Soft, Non-Tender, No Organomegaly, No Distention

## 2020-11-08 ENCOUNTER — HOSPITAL ENCOUNTER (INPATIENT)
Dept: HOSPITAL 11 - JP.ED | Age: 20
LOS: 1 days | Discharge: NURSING, REHAB OR LONG-TERM CARE | DRG: 871 | End: 2020-11-09
Attending: INTERNAL MEDICINE | Admitting: FAMILY MEDICINE
Payer: SELF-PAY

## 2020-11-08 DIAGNOSIS — D64.9: ICD-10-CM

## 2020-11-08 DIAGNOSIS — A41.9: Primary | ICD-10-CM

## 2020-11-08 DIAGNOSIS — Z79.01: ICD-10-CM

## 2020-11-08 DIAGNOSIS — Z86.59: ICD-10-CM

## 2020-11-08 DIAGNOSIS — Z86.79: ICD-10-CM

## 2020-11-08 DIAGNOSIS — M54.89: ICD-10-CM

## 2020-11-08 DIAGNOSIS — Z20.828: ICD-10-CM

## 2020-11-08 DIAGNOSIS — I33.0: ICD-10-CM

## 2020-11-08 DIAGNOSIS — F19.10: ICD-10-CM

## 2020-11-08 DIAGNOSIS — I30.1: ICD-10-CM

## 2020-11-08 DIAGNOSIS — J18.9: ICD-10-CM

## 2020-11-08 DIAGNOSIS — Z87.39: ICD-10-CM

## 2020-11-08 DIAGNOSIS — T83.39XA: ICD-10-CM

## 2020-11-08 DIAGNOSIS — R79.89: ICD-10-CM

## 2020-11-08 DIAGNOSIS — F17.210: ICD-10-CM

## 2020-11-08 DIAGNOSIS — B95.5: ICD-10-CM

## 2020-11-08 DIAGNOSIS — I07.1: ICD-10-CM

## 2020-11-08 PROCEDURE — U0002 COVID-19 LAB TEST NON-CDC: HCPCS

## 2020-11-08 NOTE — EDM.PDOC
ED HPI GENERAL MEDICAL PROBLEM





- General


Chief Complaint: Back Pain or Injury


Stated Complaint: MEDICAL


Time Seen by Provider: 20 19:30


Source of Information: Reports: Patient


History Limitations: Reports: Other (pt has been using herion today. )





- History of Present Illness


INITIAL COMMENTS - FREE TEXT/NARRATIVE: 





pt is having severe pain in her rtchest. She is quite sob. She was recently in 

[place] and she was found to have a endocarditis  from [facility].  She did leave the [facility]

AMA so she has not had any antibiotics for the last 2 days. She has been using 

herion and meth.   


Onset: Gradual


Duration: Day(s):


Location: Reports: Chest, Lower Extremity, Left, Lower Extremity, Right


Associated Symptoms: Reports: Chest Pain, Cough, Diaphoresis, Shortness of 

Breath, Weakness


  ** Middle Posterior Back


Pain Score (Numeric/FACES): 10





- Related Data


                                    Allergies











Allergy/AdvReac Type Severity Reaction Status Date / Time


 


No Known Allergies Allergy   Verified 20 03:22











Home Meds: 


                                    Home Meds





NK [No Known Home Meds]  20 [History]











Past Medical History


Cardiovascular History: Reports: Other (See Below)


Other Cardiovascular History: currently tachycardiac


Respiratory History: Reports: Pneumonia, Recurrent, Other (See Below)


Other Respiratory History: smokes ten cigarettes a day


OB/GYN History: Reports: Pregnancy


Other OB/GYN History: 


Musculoskeletal History: Reports: Fracture


Psychiatric History: Reports: Addiction, Anxiety, Depression





- Past Surgical History


HEENT Surgical History: Reports: Oral Surgery


Respiratory Surgical History: Reports: None


GI Surgical History: Reports: Cholecystectomy





Social & Family History





- Family History


Family Medical History: Noncontributory





- Tobacco Use


Tobacco Use Status *Q: Current Every Day Tobacco User


Years of Tobacco use: 5


Packs/Tins Daily: 0.5





- Caffeine Use


Caffeine Use: Reports: Soda





- Recreational Drug Use


Recreational Drug Use: Yes


Drug Use in Last 12 Months: Yes


Recreational Drug Type: Reports: Heroin, Marijuana/Hashish, Methamphetamine


Recreational Drug Use Frequency: Daily





- Living Situation & Occupation


Living situation: Reports: Single, with Family


Occupation: Unemployed (lives with Dad in [place]. Has two 

children ages 8 years and 7 years - does not have custody they live with her 

Cousin.)





ED ROS GENERAL





- Review of Systems


Review Of Systems: See Below


Constitutional: Reports: Weakness, Fatigue


HEENT: Reports: No Symptoms


Respiratory: Reports: Shortness of Breath, Pleuritic Chest Pain, Cough


Cardiovascular: Reports: Chest Pain, Dyspnea on Exertion


Endocrine: Reports: Fatigue


GI/Abdominal: Reports: No Symptoms


: Reports: No Symptoms


Musculoskeletal: Reports: No Symptoms


Skin: Reports: No Symptoms





ED EXAM, UPPER BACK/NECK PAIN





- Physical Exam


Exam: See Below


Text/Narrative:: 





pt arrived with severe rt sided pleuritic pain, sob, Having difficulty lying 

flat.  She has o2 sats that are good. She has a history of a staph pericarditis 

from iv drug use. She was hospitalized at [facility] after 

being in the hosp 36 hours. 


Exam Limited By: No Limitations


General Appearance: Alert, Anxious, Moderate Distress


Ears Exam: Normal TMs


Nose Exam: Normal Inspection


Throat/Mouth Exam: Normal Inspection


Head Exam: Atraumatic


Neck Exam: Non-Tender


Cardiovascular/Respiratory: Regular Rate, Rhythm, Tachycardia


GI/Abdominal: Soft, Non-Tender


 (Female) Exam: Deferred


Rectal (Female) Exam: Deferred


Back Exam: Normal Inspection


Extremities: Normal Inspection


Neurologic: Alert, Oriented x 3


Psychiatric: Normal Affect





Course





- Vital Signs


Last Recorded V/S: 


                                Last Vital Signs











Temp  36.7 C   20 19:19


 


Pulse  121 H  20 23:26


 


Resp  20   20 23:26


 


BP  123/76   20 23:26


 


Pulse Ox  95   20 23:26














- Orders/Labs/Meds


Orders: 


                               Active Orders 24 hr











 Category Date Time Status


 


 EKG Documentation Completion [RC] ASDIRECTED Care  20 20:09 Active


 


 Chest 2V [CR] Stat Exams  20 20:23 Taken


 


 CULTURE BLOOD [BC] Urgent Lab  20 20:47 Received


 


 CULTURE BLOOD [BC] Urgent Lab  20 20:47 Received


 


 UA W/MICROSCOPIC [URIN] Urgent Lab  20 01:53 Ordered


 


 Sodium Chloride 0.9% [Normal Saline] 1,000 ml Med  20 21:15 Active





 IV ASDIRECTED   


 


 Sodium Chloride 0.9% [Normal Saline] 1,000 ml Med  20 22:00 Active





 IV ASDIRECTED   


 


 Sodium Chloride 0.9% [Normal Saline] 1,000 ml Med  20 00:15 Active





 IV ASDIRECTED   


 


 Blood Culture x2 Reflex Set [OM.PC] Urgent Oth  20 20:10 Ordered


 


 EKG 12 Lead [EK] Routine Ther  20 20:09 Ordered








                                Medication Orders





Sodium Chloride (Normal Saline)  1,000 mls @ 250 mls/hr IV ASDIRECTED CINDY


   Last Infusion: 20 22:43  Dose: 250 mls/hr


   Documented by: MELVIN


   Infusion: 20 22:02  Dose: 750 mls/hr


   Documented by: MELVIN


   Admin: 20 21:39  Dose: 250 mls/hr


   Documented by: MELVIN


Sodium Chloride (Normal Saline)  1,000 mls @ 999 mls/hr IV ASDIRECTED CINDY


   Last Admin: 20 22:41  Dose: 999 mls/hr


   Documented by: CUBA


Sodium Chloride (Normal Saline)  1,000 mls @ 999 mls/hr IV ASDIRECTED CINDY








Labs: 


                                Laboratory Tests











  20 Range/Units





  20:47 20:47 20:47 


 


WBC  11.6 H    (4.5-11.0)  K/uL


 


RBC  3.08 L    (3.30-5.50)  M/uL


 


Hgb  8.0 L D    (12.0-15.0)  g/dL


 


Hct  25.5 L    (36.0-48.0)  %


 


MCV  83    (80-98)  fL


 


MCH  26 L    (27-31)  pg


 


MCHC  31 L    (32-36)  %


 


Plt Count  612 H    (150-400)  K/uL


 


Neut % (Auto)  66    (36-66)  %


 


Lymph % (Auto)  22 L    (24-44)  %


 


Mono % (Auto)  10 H    (2-6)  %


 


Eos % (Auto)  2    (2-4)  %


 


Baso % (Auto)  0    (0-1)  %


 


D-Dimer, Quantitative     (0.0-500.0)  ng/mL


 


Sodium   135 L   (140-148)  mmol/L


 


Potassium   4.3   (3.6-5.2)  mmol/L


 


Chloride   102   (100-108)  mmol/L


 


Carbon Dioxide   21   (21-32)  mmol/L


 


Anion Gap   16.3 H   (5.0-14.0)  mmol/L


 


BUN   27 H D   (7-18)  mg/dL


 


Creatinine   1.7 H D   (0.6-1.0)  mg/dL


 


Est Cr Clr Drug Dosing   44.82   mL/min


 


Estimated GFR (MDRD)   38 L   (>60)  


 


Glucose   95   ()  mg/dL


 


Calcium   7.7 L   (8.5-10.1)  mg/dL


 


Total Bilirubin   0.4   (0.2-1.0)  mg/dL


 


AST   48 H   (15-37)  U/L


 


ALT   43   (12-78)  U/L


 


Alkaline Phosphatase   85   ()  U/L


 


C-Reactive Protein   19.82 H   (0.0-0.3)  mg/dL


 


NT-Pro-B Natriuret Pep    1 L  (5-125)  pg/mL


 


Total Protein   6.9   (6.4-8.2)  g/dL


 


Albumin   1.4 L   (3.4-5.0)  g/dL


 


Globulin   5.5 H   (2.3-3.5)  g/dL


 


Albumin/Globulin Ratio   0.3 L   (1.2-2.2)  


 


HCG, Qual     














  20 Range/Units





  20:56 21:08 


 


WBC    (4.5-11.0)  K/uL


 


RBC    (3.30-5.50)  M/uL


 


Hgb    (12.0-15.0)  g/dL


 


Hct    (36.0-48.0)  %


 


MCV    (80-98)  fL


 


MCH    (27-31)  pg


 


MCHC    (32-36)  %


 


Plt Count    (150-400)  K/uL


 


Neut % (Auto)    (36-66)  %


 


Lymph % (Auto)    (24-44)  %


 


Mono % (Auto)    (2-6)  %


 


Eos % (Auto)    (2-4)  %


 


Baso % (Auto)    (0-1)  %


 


D-Dimer, Quantitative   8974.82 H  (0.0-500.0)  ng/mL


 


Sodium    (140-148)  mmol/L


 


Potassium    (3.6-5.2)  mmol/L


 


Chloride    (100-108)  mmol/L


 


Carbon Dioxide    (21-32)  mmol/L


 


Anion Gap    (5.0-14.0)  mmol/L


 


BUN    (7-18)  mg/dL


 


Creatinine    (0.6-1.0)  mg/dL


 


Est Cr Clr Drug Dosing    mL/min


 


Estimated GFR (MDRD)    (>60)  


 


Glucose    ()  mg/dL


 


Calcium    (8.5-10.1)  mg/dL


 


Total Bilirubin    (0.2-1.0)  mg/dL


 


AST    (15-37)  U/L


 


ALT    (12-78)  U/L


 


Alkaline Phosphatase    ()  U/L


 


C-Reactive Protein    (0.0-0.3)  mg/dL


 


NT-Pro-B Natriuret Pep    (5-125)  pg/mL


 


Total Protein    (6.4-8.2)  g/dL


 


Albumin    (3.4-5.0)  g/dL


 


Globulin    (2.3-3.5)  g/dL


 


Albumin/Globulin Ratio    (1.2-2.2)  


 


HCG, Qual  Negative   











Meds: 


Medications











Generic Name Dose Route Start Last Admin





  Trade Name Freq  PRN Reason Stop Dose Admin


 


Sodium Chloride  1,000 mls @ 250 mls/hr  20 21:15  20 22:43





  Normal Saline  IV   250 mls/hr





  ASDIRECTED CINDY   Infusion


 


Sodium Chloride  1,000 mls @ 999 mls/hr  20 22:00  20 22:41





  Normal Saline  IV   999 mls/hr





  ASDIRECTED CINDY   Administration


 


Sodium Chloride  1,000 mls @ 999 mls/hr  20 00:15 





  Normal Saline  IV  





  ASDIRECTED CINDY  














Discontinued Medications














Generic Name Dose Route Start Last Admin





  Trade Name Kamini  PRN Reason Stop Dose Admin


 


Clindamycin HCl  300 mg  20 22:12  20 00:00





  Cleocin  PO  20 22:13  Not Given





  ONETIME ONE  


 


Hydromorphone HCl  0.5 mg  20 20:23  20 21:17





  Dilaudid  IVPUSH  20 20:24  0.5 mg





  ONETIME ONE   Administration


 


Clindamycin Phosphate 600 mg/  54 mls @ 100 mls/hr  20 22:42  20 

22:54





  Sodium Chloride  IV  20 23:14  100 mls/hr





  ONETIME ONE   Administration


 


Sodium Chloride  100 mls @ 4 mls/sec  20 23:42  20 00:01





  Normal Saline  IV  20 23:43  4 mls/sec





  ASDIRECTED STA   Administration


 


Iopamidol  100 ml  20 23:43  20 00:01





  Isovue-370 (76%)  IV  20 23:44  100 ml





  .AS DIRECTED STA   Administration














- Re-Assessments/Exams


Free Text/Narrative Re-Assessment/Exam: 





20 22:55


 chest xray shows marked distention of her Gi tract.  She hs elevated diaphrams 

from the distention.  Her DDimer is very high. Her crreatnine is 1.7.  Will give

 a liter of fluid and use a reduced dose of dye to do a  angio of the chest. 

20 22:56





20 01:15


chest angio did not reveal pulmonary embol;i. There was pulmonary nodules and  

and bibasilar consolidation





Departure





- Departure


Time of Disposition: 01:54


Disposition: Admitted As Inpatient 64


Condition: Fair


Clinical Impression: 


 Acute staphylococcal pericarditis, Bilateral pneumonia, Anemia








- Discharge Information


Referrals: 


PCP,None [Primary Care Provider] - 


Forms:  ED Department Discharge


Care Plan Goals: 


admit to Dr Hager





Sepsis Event Note (ED)





- Evaluation


Sepsis Screening Result: No Definite Risk





- Focused Exam


Vital Signs: 


                                   Vital Signs











  Temp Pulse Resp BP Pulse Ox


 


 20 23:26   121 H  20  123/76  95


 


 20 21:37   116 H  16  121/82  96


 


 20 19:19  36.7 C  130 H  20  127/84  95














- My Orders


Last 24 Hours: 


My Active Orders





20 20:09


EKG Documentation Completion [RC] ASDIRECTED 


EKG 12 Lead [EK] Routine 





20 20:10


Blood Culture x2 Reflex Set [OM.PC] Urgent 





20 20:23


Chest 2V [CR] Stat 





20 20:47


CULTURE BLOOD [BC] Urgent 


CULTURE BLOOD [BC] Urgent 





20 21:15


Sodium Chloride 0.9% [Normal Saline] 1,000 ml IV ASDIRECTED 





20 22:00


Sodium Chloride 0.9% [Normal Saline] 1,000 ml IV ASDIRECTED 





20 00:15


Sodium Chloride 0.9% [Normal Saline] 1,000 ml IV ASDIRECTED 





20 01:53


UA W/MICROSCOPIC [URIN] Urgent 














- Assessment/Plan


Last 24 Hours: 


My Active Orders





20 20:09


EKG Documentation Completion [RC] ASDIRECTED 


EKG 12 Lead [EK] Routine 





20 20:10


Blood Culture x2 Reflex Set [OM.PC] Urgent 





20 20:23


Chest 2V [CR] Stat 





20 20:47


CULTURE BLOOD [BC] Urgent 


CULTURE BLOOD [BC] Urgent 





20 21:15


Sodium Chloride 0.9% [Normal Saline] 1,000 ml IV ASDIRECTED 





20 22:00


Sodium Chloride 0.9% [Normal Saline] 1,000 ml IV ASDIRECTED 





20 00:15


Sodium Chloride 0.9% [Normal Saline] 1,000 ml IV ASDIRECTED 





20 01:53


UA W/MICROSCOPIC [URIN] Urgent

## 2020-11-09 NOTE — CRLCT
INDICATION:



Elevated D-dimer and shortness of breath.



TECHNIQUE:



CT chest PE was acquired with 100 cc Isovue 370 intravenous contrast.



COMPARISON:



Chest CT 04/24/2020



FINDINGS:



Heart and vasculature: Contrast opacification of the pulmonary arterial 

tree is adequate. No sign of pulmonary embolism. Thoracic aorta is normal 

in caliber. No pericardial effusion 



Lungs and pleural: Small right pleural effusion with some loculated 

components at the apex as well as within the fissures. Multiple bilateral 

pulmonary nodules with some consolidation in the right lower lobe as well 

as wedge-shaped opacities in the lingula and left lower lobe. 



Lymph nodes/mediastinum: Subcarinal lymph nodes measure up to 10 

millimeters in short axis. 



Chest wall: No masses. 



Bones: Unremarkable for age. 



IMPRESSION:



1. No evidence of pulmonary embolus.



2. Numerous bilateral new pulmonary nodules compared to the April 

examination. Differential diagnosis includes infectious, inflammatory and 

neoplastic etiologies.



3. Small right pleural effusion with loculated components at the apex of 

the hemithorax as well as within the major and minor fissures.



4. Bibasilar consolidation consistent with atelectasis or pneumonia.



Please note that all CT scans at this facility use dose modulation, 

iterative reconstruction, and/or weight-based dosing when appropriate to 

reduce radiation dose to as low as reasonably achievable.



Dictated by Aamir Garcia MD @ Nov 9 2020 12:41AM



Signed by Dr. Amair Garcia @ Nov 9 2020 12:51AM

## 2020-11-09 NOTE — PCM.[id number]
**Discharge Summary





- Hospital Course


Brief History: Ms. Cristobal is a 20-year-old woman who was admitted through the 

emergency department with untreated endocarditis and evidence of pulmonary 

infection as well as IUD perforation of the uterus.





- Discharge Data


Discharge Date: 11/09/20


Discharge Disposition: DC/Tfer to Acute Hospital 02


Condition: Serious





- Referral to Home Health


Primary Care Physician: 


PCP None








- Discharge Diagnosis/Problem(s)


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


SNOMED Code(s): 96942597


   ICD Code: I33.0 - ACUTE AND SUBACUTE INFECTIVE ENDOCARDITIS; B95.8 - UNSP 

STAPHYLOCOCCUS AS THE CAUSE OF DISEASES CLASSD ELSWHR   Status: Acute   Current 

Visit: Yes   





(2) Uterine perforation by intrauterine contraceptive device


SNOMED Code(s): 406551257


   ICD Code: T83.39XA - MECH COMPL OF INTRAUTERINE CONTRACEPTIVE DEVICE, INIT 

ENCNTR   Status: Acute   Current Visit: Yes   





(3) Bilateral pneumonia


SNOMED Code(s): 906348761


   ICD Code: J18.9 - PNEUMONIA, UNSPECIFIED ORGANISM   Status: Acute   Current 

Visit: Yes   





(4) Anemia


SNOMED Code(s): 346433360


   ICD Code: D64.9 - ANEMIA, UNSPECIFIED   Status: Acute   Current Visit: Yes   





(5) IV drug abuse


SNOMED Code(s): 548067895, 264187088


   ICD Code: F19.10 - OTHER PSYCHOACTIVE SUBSTANCE ABUSE, UNCOMPLICATED   

Status: Chronic   Priority: High   Current Visit: No   





- Patient Summary/Data


Hospital Course: 





Ms. Cristobal is a 20-year-old woman who was admitted through the emergency 

department with weakness and fever secondary to untreated endocarditis.  She was

hospitalized last week at [facility] in [facility].  She 

was febrile on presentation and blood cultures grew methicillin sensitive staph 

aureus.  On evaluation echocardiogram was obtained and showed severe tricuspid 

regurgitation, felt to be secondary to endocarditis.  She does have a known 

longstanding history of IV drug abuse.  She admits to use of heroin and 

methamphetamine.  She was started on IV nafcillin with the plan to continue this

as an outpatient.  Unfortunately she left the hospital AMA on Friday, November 6.  The weekend she did not feel well and presented to our emergency department 

on the evening of Sunday, November 8.  Urine drug screen was again positive for 

methamphetamine and heroin.  CT scan of the chest was obtained because of her 

symptoms of shortness of breath as well as upper back pain.  CT scan was 

negative for pulmonary emboli but did show multiple bilateral pulmonary nodules,

infectious versus inflammatory.  T scan of the abdomen and pelvis was obtained 

and did show an intrauterine IUD that appeared to have perforated the wall of 

the uterus.  Blood cultures were again obtained and she was started on IV 

antibiotic therapy with vancomycin and meropenem.  Given multiple problems it 

was felt to be most appropriate to transfer the patient to tertiary care for 

further evaluation and management.  She has been accepted in transfer to [facility] in [facility].  She will be transferred via [facility] ambulance.





- Patient Instructions


Diet: Usual Diet as Tolerated


Activity: As Tolerated


Other/Special Instructions: Transfer to [facility] via 

[facility] ambulance





- Discharge Plan


*PRESCRIPTION DRUG MONITORING PROGRAM REVIEWED*: Not Applicable


*COPY OF PRESCRIPTION DRUG MONITORING REPORT IN PATIENT JONA: Not Applicable


Home Medications: 


                                    Home Meds





Meropenem [Merrem] 1 gm IV Q8H  sdv 11/09/20 [Rx]


Methadone 10 mg PO TID  tablet 11/09/20 [Rx]


Vancomycin 1.5 gm IV Q12H  sdv 11/09/20 [Rx]











- Discharge Summary/Plan Comment


DC Time >30 min.: No





- Patient Data


Vitals - Most Recent: 


                                Last Vital Signs











Temp  97.2 F   11/09/20 13:21


 


Pulse  101 H  11/09/20 13:21


 


Resp  22 H  11/09/20 13:21


 


BP  114/70   11/09/20 13:21


 


Pulse Ox  96   11/09/20 13:21











Weight - Most Recent: 204 lb


I&O - Last 24 hours: 


                                 Intake & Output











 11/09/20 11/09/20 11/09/20





 06:59 14:59 22:59


 


Intake Total 921  500


 


Balance 921  500











Lab Results - Last 24 hrs: 


                         Laboratory Results - last 24 hr











  11/08/20 11/08/20 11/08/20 Range/Units





  20:47 20:47 20:47 


 


WBC  11.6 H    (4.5-11.0)  K/uL


 


RBC  3.08 L    (3.30-5.50)  M/uL


 


Hgb  8.0 L D    (12.0-15.0)  g/dL


 


Hct  25.5 L    (36.0-48.0)  %


 


MCV  83    (80-98)  fL


 


MCH  26 L    (27-31)  pg


 


MCHC  31 L    (32-36)  %


 


Plt Count  612 H    (150-400)  K/uL


 


Neut % (Auto)  66    (36-66)  %


 


Lymph % (Auto)  22 L    (24-44)  %


 


Mono % (Auto)  10 H    (2-6)  %


 


Eos % (Auto)  2    (2-4)  %


 


Baso % (Auto)  0    (0-1)  %


 


D-Dimer, Quantitative     (0.0-500.0)  ng/mL


 


Sodium   135 L   (140-148)  mmol/L


 


Potassium   4.3   (3.6-5.2)  mmol/L


 


Chloride   102   (100-108)  mmol/L


 


Carbon Dioxide   21   (21-32)  mmol/L


 


Anion Gap   16.3 H   (5.0-14.0)  mmol/L


 


BUN   27 H D   (7-18)  mg/dL


 


Creatinine   1.7 H D   (0.6-1.0)  mg/dL


 


Est Cr Clr Drug Dosing   44.82   mL/min


 


Estimated GFR (MDRD)   38 L   (>60)  


 


Glucose   95   ()  mg/dL


 


Calcium   7.7 L   (8.5-10.1)  mg/dL


 


Total Bilirubin   0.4   (0.2-1.0)  mg/dL


 


AST   48 H   (15-37)  U/L


 


ALT   43   (12-78)  U/L


 


Alkaline Phosphatase   85   ()  U/L


 


C-Reactive Protein   19.82 H   (0.0-0.3)  mg/dL


 


NT-Pro-B Natriuret Pep    1 L  (5-125)  pg/mL


 


Total Protein   6.9   (6.4-8.2)  g/dL


 


Albumin   1.4 L   (3.4-5.0)  g/dL


 


Globulin   5.5 H   (2.3-3.5)  g/dL


 


Albumin/Globulin Ratio   0.3 L   (1.2-2.2)  


 


HCG, Qual     


 


Urine Color     (YELLOW)  


 


Urine Appearance     (CLEAR)  


 


Urine pH     (5.0-8.0)  


 


Ur Specific Gravity     (1.008-1.030)  


 


Urine Protein     (NEGATIVE)  mg/dL


 


Urine Glucose (UA)     (NEGATIVE)  mg/dL


 


Urine Ketones     (NEGATIVE)  mg/dL


 


Urine Occult Blood     (NEGATIVE)  


 


Urine Nitrite     (NEGATIVE)  


 


Urine Bilirubin     (NEGATIVE)  


 


Urine Urobilinogen     (0.2-1.0)  EU/dL


 


Ur Leukocyte Esterase     (NEGATIVE)  


 


Urine RBC     (0-5)  


 


Urine WBC     (0-5)  


 


Ur Epithelial Cells     


 


Amorphous Sediment     


 


Urine Bacteria     


 


Urine Mucus     


 


Urine Opiates Screen     (NEGATIVE)  


 


Ur Oxycodone Screen     (NEGATIVE)  


 


Urine Methadone Screen     (NEGATIVE)  


 


Ur Propoxyphene Screen     (NEGATIVE)  


 


Ur Barbiturates Screen     (NEGATIVE)  


 


Ur Tricyclics Screen     (NEGATIVE)  


 


Ur Phencyclidine Scrn     (NEGATIVE)  


 


Ur Amphetamine Screen     (NEGATIVE)  


 


U Methamphetamines Scrn     (NEGATIVE)  


 


Urine MDMA Screen     (NEGATIVE)  


 


U Benzodiazepines Scrn     (NEGATIVE)  


 


U Cocaine Metab Screen     (NEGATIVE)  


 


U Marijuana (THC) Screen     (NEGATIVE)  


 


SARS-CoV-2 RNA (HIRA)     (NEGATIVE)  














  11/08/20 11/08/20 11/09/20 Range/Units





  20:56 21:08 01:53 


 


WBC     (4.5-11.0)  K/uL


 


RBC     (3.30-5.50)  M/uL


 


Hgb     (12.0-15.0)  g/dL


 


Hct     (36.0-48.0)  %


 


MCV     (80-98)  fL


 


MCH     (27-31)  pg


 


MCHC     (32-36)  %


 


Plt Count     (150-400)  K/uL


 


Neut % (Auto)     (36-66)  %


 


Lymph % (Auto)     (24-44)  %


 


Mono % (Auto)     (2-6)  %


 


Eos % (Auto)     (2-4)  %


 


Baso % (Auto)     (0-1)  %


 


D-Dimer, Quantitative   8974.82 H   (0.0-500.0)  ng/mL


 


Sodium     (140-148)  mmol/L


 


Potassium     (3.6-5.2)  mmol/L


 


Chloride     (100-108)  mmol/L


 


Carbon Dioxide     (21-32)  mmol/L


 


Anion Gap     (5.0-14.0)  mmol/L


 


BUN     (7-18)  mg/dL


 


Creatinine     (0.6-1.0)  mg/dL


 


Est Cr Clr Drug Dosing     mL/min


 


Estimated GFR (MDRD)     (>60)  


 


Glucose     ()  mg/dL


 


Calcium     (8.5-10.1)  mg/dL


 


Total Bilirubin     (0.2-1.0)  mg/dL


 


AST     (15-37)  U/L


 


ALT     (12-78)  U/L


 


Alkaline Phosphatase     ()  U/L


 


C-Reactive Protein     (0.0-0.3)  mg/dL


 


NT-Pro-B Natriuret Pep     (5-125)  pg/mL


 


Total Protein     (6.4-8.2)  g/dL


 


Albumin     (3.4-5.0)  g/dL


 


Globulin     (2.3-3.5)  g/dL


 


Albumin/Globulin Ratio     (1.2-2.2)  


 


HCG, Qual  Negative    


 


Urine Color    Yellow  (YELLOW)  


 


Urine Appearance    Slightly cloudy A  (CLEAR)  


 


Urine pH    5.5  (5.0-8.0)  


 


Ur Specific Gravity    1.020  (1.008-1.030)  


 


Urine Protein    100 H  (NEGATIVE)  mg/dL


 


Urine Glucose (UA)    Negative  (NEGATIVE)  mg/dL


 


Urine Ketones    Negative  (NEGATIVE)  mg/dL


 


Urine Occult Blood    Moderate H  (NEGATIVE)  


 


Urine Nitrite    Negative  (NEGATIVE)  


 


Urine Bilirubin    Negative  (NEGATIVE)  


 


Urine Urobilinogen    0.2  (0.2-1.0)  EU/dL


 


Ur Leukocyte Esterase    Negative  (NEGATIVE)  


 


Urine RBC    5-10 H  (0-5)  


 


Urine WBC    0-5  (0-5)  


 


Ur Epithelial Cells    Many  


 


Amorphous Sediment    Not seen  


 


Urine Bacteria    Moderate  


 


Urine Mucus    Not seen  


 


Urine Opiates Screen     (NEGATIVE)  


 


Ur Oxycodone Screen     (NEGATIVE)  


 


Urine Methadone Screen     (NEGATIVE)  


 


Ur Propoxyphene Screen     (NEGATIVE)  


 


Ur Barbiturates Screen     (NEGATIVE)  


 


Ur Tricyclics Screen     (NEGATIVE)  


 


Ur Phencyclidine Scrn     (NEGATIVE)  


 


Ur Amphetamine Screen     (NEGATIVE)  


 


U Methamphetamines Scrn     (NEGATIVE)  


 


Urine MDMA Screen     (NEGATIVE)  


 


U Benzodiazepines Scrn     (NEGATIVE)  


 


U Cocaine Metab Screen     (NEGATIVE)  


 


U Marijuana (THC) Screen     (NEGATIVE)  


 


SARS-CoV-2 RNA (HIRA)     (NEGATIVE)  














  11/09/20 11/09/20 Range/Units





  02:57 03:30 


 


WBC    (4.5-11.0)  K/uL


 


RBC    (3.30-5.50)  M/uL


 


Hgb    (12.0-15.0)  g/dL


 


Hct    (36.0-48.0)  %


 


MCV    (80-98)  fL


 


MCH    (27-31)  pg


 


MCHC    (32-36)  %


 


Plt Count    (150-400)  K/uL


 


Neut % (Auto)    (36-66)  %


 


Lymph % (Auto)    (24-44)  %


 


Mono % (Auto)    (2-6)  %


 


Eos % (Auto)    (2-4)  %


 


Baso % (Auto)    (0-1)  %


 


D-Dimer, Quantitative    (0.0-500.0)  ng/mL


 


Sodium    (140-148)  mmol/L


 


Potassium    (3.6-5.2)  mmol/L


 


Chloride    (100-108)  mmol/L


 


Carbon Dioxide    (21-32)  mmol/L


 


Anion Gap    (5.0-14.0)  mmol/L


 


BUN    (7-18)  mg/dL


 


Creatinine    (0.6-1.0)  mg/dL


 


Est Cr Clr Drug Dosing    mL/min


 


Estimated GFR (MDRD)    (>60)  


 


Glucose    ()  mg/dL


 


Calcium    (8.5-10.1)  mg/dL


 


Total Bilirubin    (0.2-1.0)  mg/dL


 


AST    (15-37)  U/L


 


ALT    (12-78)  U/L


 


Alkaline Phosphatase    ()  U/L


 


C-Reactive Protein    (0.0-0.3)  mg/dL


 


NT-Pro-B Natriuret Pep    (5-125)  pg/mL


 


Total Protein    (6.4-8.2)  g/dL


 


Albumin    (3.4-5.0)  g/dL


 


Globulin    (2.3-3.5)  g/dL


 


Albumin/Globulin Ratio    (1.2-2.2)  


 


HCG, Qual    


 


Urine Color    (YELLOW)  


 


Urine Appearance    (CLEAR)  


 


Urine pH    (5.0-8.0)  


 


Ur Specific Gravity    (1.008-1.030)  


 


Urine Protein    (NEGATIVE)  mg/dL


 


Urine Glucose (UA)    (NEGATIVE)  mg/dL


 


Urine Ketones    (NEGATIVE)  mg/dL


 


Urine Occult Blood    (NEGATIVE)  


 


Urine Nitrite    (NEGATIVE)  


 


Urine Bilirubin    (NEGATIVE)  


 


Urine Urobilinogen    (0.2-1.0)  EU/dL


 


Ur Leukocyte Esterase    (NEGATIVE)  


 


Urine RBC    (0-5)  


 


Urine WBC    (0-5)  


 


Ur Epithelial Cells    


 


Amorphous Sediment    


 


Urine Bacteria    


 


Urine Mucus    


 


Urine Opiates Screen  Negative   (NEGATIVE)  


 


Ur Oxycodone Screen  Negative   (NEGATIVE)  


 


Urine Methadone Screen  Negative   (NEGATIVE)  


 


Ur Propoxyphene Screen  Negative   (NEGATIVE)  


 


Ur Barbiturates Screen  Negative   (NEGATIVE)  


 


Ur Tricyclics Screen  Negative   (NEGATIVE)  


 


Ur Phencyclidine Scrn  Negative   (NEGATIVE)  


 


Ur Amphetamine Screen  Presumptive positive H   (NEGATIVE)  


 


U Methamphetamines Scrn  Presumptive positive H   (NEGATIVE)  


 


Urine MDMA Screen  Negative   (NEGATIVE)  


 


U Benzodiazepines Scrn  Negative   (NEGATIVE)  


 


U Cocaine Metab Screen  Negative   (NEGATIVE)  


 


U Marijuana (THC) Screen  Negative   (NEGATIVE)  


 


SARS-CoV-2 RNA (HIRA)   Negative  (NEGATIVE)  











[facility] Results - Last 24 hrs: 


                                  Microbiology











 11/09/20 03:21 Influenza Type A Antigen Screen - Final





 Nasopharyngeal Swab    NEGATIVE INFLUENZA A VIRUS AG





    REFERENCE RANGE: NEGATIVE





 Influenza Type B Antigen Screen - Final





    NEGATIVE INFLUENZA B VIRUS AG





    REFERENCE RANGE: NEGATIVE











Med Orders - Current: 


                               Current Medications





Acetaminophen (Tylenol)  650 mg PO Q4H PRN


   PRN Reason: Pain


   Last Admin: 11/09/20 03:53 Dose:  650 mg


   Documented by: 


Albuterol (Proventil Neb Soln)  2.5 mg NEB Q4H PRN


   PRN Reason: Shortness of Breath


Vancomycin HCl 1.5 gm/ Sodium (Chloride)  250 mls @ 166.667 mls/hr IV Q12H [facility]


Sodium Chloride (Normal Saline)  1,000 mls @ 125 mls/hr IV ASDIRECTED [facility]


   Last Admin: 11/09/20 09:30 Dose:  125 mls/hr


   Documented by: 


Meropenem 1 gm/ Sodium (Chloride)  100 mls @ 200 mls/hr IV Q8H [facility]


   Last Admin: 11/09/20 12:59 Dose:  200 mls/hr


   Documented by: 


Ketorolac Tromethamine (Toradol)  30 mg IVPUSH Q8H PRN


   PRN Reason: Pain


   Stop: 11/14/20 02:58


   Last Admin: 11/09/20 09:22 Dose:  30 mg


   Documented by: 


Lorazepam (Ativan)  1 mg PO Q6H PRN


   PRN Reason: Anxiety


Methadone HCl (Methadone)  10 mg PO TID [facility]


   Last Admin: 11/09/20 14:55 Dose:  10 mg


   Documented by: 





Discontinued Medications





Clindamycin HCl (Cleocin)  300 mg PO ONETIME ONE


   Stop: 11/08/20 22:13


   Last Admin: 11/09/20 00:00 Dose:  Not Given


   Documented by: 


Hydromorphone HCl (Dilaudid)  0.5 mg IVPUSH ONETIME ONE


   Stop: 11/08/20 20:24


   Last Admin: 11/08/20 21:17 Dose:  0.5 mg


   Documented by: 


Sodium Chloride (Normal Saline)  1,000 mls @ 250 mls/hr IV ASDIRECTED [facility]


   Last Infusion: 11/08/20 22:43 Dose:  250 mls/hr


   Documented by: 


Sodium Chloride (Normal Saline)  1,000 mls @ 999 mls/hr IV ASDIRECTED [facility]


   Last Admin: 11/08/20 22:41 Dose:  999 mls/hr


   Documented by: 


Clindamycin Phosphate 600 mg/ (Sodium Chloride)  54 mls @ 100 mls/hr IV ONETIME 

ONE


   Stop: 11/08/20 23:14


   Last Admin: 11/08/20 22:54 Dose:  100 mls/hr


   Documented by: 


Sodium Chloride (Normal Saline)  100 mls @ 4 mls/sec IV ASDIRECTED [facility]


   Stop: 11/08/20 23:43


   Last Admin: 11/09/20 00:01 Dose:  4 mls/sec


   Documented by: 


Sodium Chloride (Normal Saline)  1,000 mls @ 999 mls/hr IV ASDIRECTED [facility]


   Last Admin: 11/09/20 00:15 Dose:  999 mls/hr


   Documented by: 


Meropenem 1 gm/ Sodium (Chloride)  100 mls @ 200 mls/hr IV Q8H [facility]


   Last Admin: 11/09/20 03:53 Dose:  200 mls/hr


   Documented by: 


Vancomycin HCl 1.5 gm/ Sodium (Chloride)  250 mls @ 166.667 mls/hr IV ONETIME 

ONE


   Stop: 11/09/20 05:59


   Last Admin: 11/09/20 04:52 Dose:  166.667 mls/hr


   Documented by: 


Influenza Virus Vaccine (Pharmacy To Dose - Influenza Vaccine)  1 each IM 

ONETIME ONE


   Stop: 11/09/20 09:01


Influenza Virus Vaccine (Fluzone Quad [id number] Syringe)  60 mcg IM .ONCE ONE


   Stop: 11/09/20 10:01


   Last Admin: 11/09/20 09:27 Dose:  Not Given


   Documented by: 


Iopamidol (Isovue-370 (76%))  100 ml IV .AS DIRECTED [facility]


   Stop: 11/08/20 23:44


   Last Admin: 11/09/20 00:01 Dose:  100 ml


   Documented by: 











- Exam


General: Reports: Alert, Oriented, Cooperative, Mild Distress


Lungs: Reports: Clear to Auscultation, Normal Respiratory Effort


Cardiovascular: Reports: Regular Rate, Regular Rhythm, Murmurs


GI/Abdominal Exam: Soft, Non-Tender, No Organomegaly, No Distention


Extremities: Non-Tender, No Pedal Edema

## 2020-11-09 NOTE — CR
CHEST: 2 view

 

CLINICAL HISTORY:SOB

 

COMPARISON:April 20, 2020

 

FINDINGS:  There is patchy bilateral airspace disease in both mid and lower lung

fields. There is an area of consolidation in the right infrahilar region. There

is lingular infiltrate. Some of these were seen on a CT scan from April 2020.

There is a new right pleural effusion.

 

IMPRESSION: Patchy bilateral lower lobe infiltrates and some right infrahilar

consolidation. Much of this was seen on April CT scan. Some new superimposed

infiltrate is not excluded

 

New right pleural effusion

## 2020-11-09 NOTE — HP
IDENTIFYING DATA:  Sabrina Cristobal is a 20-year-old single  female from

[place], [place].

 

CHIEF COMPLAINT:  Upper back and anterior chest pain and shortness of breath.

 

HISTORY OF PRESENT ILLNESS:  Adult female was hospitalized at [facility] in

[place] from 11/02/2020 through 11/06/2020 with noted methicillin-sensitive Staph

aureus thought to be secondary to right-sided endocarditis.  She was managed on IV drug

therapies and after 3-1/4 days of treatment, left against medical advice.  Oral antibiotics

were ordered, though the patient noted with absence of insurance, she was unable to afford

these, and therefore, did not continue with the antibiotic therapy.

 

She has had ongoing shortness of breath, cough with bloody sputum production and pleuritic

pain in the upper chest as well as fever to 103 degrees reported.

 

Denies a history of COPD, though does admit to having access to nebulizer therapy at home.

She is a smoker of 1/2 pack per day.  Denies a history of noted COVID or influenza

infections.  Note that testing while hospitalized in [place] was negative for both

coronavirus and influenza.  She denies a history of tuberculosis or known exposure to TB.

No noted exposure to COVID.

 

PAST MEDICAL HISTORY/PREVIOUS SURGERIES:  Elective cholecystectomy.  She has had 2

pregnancies.  Children are currently not in her care.  IUD is in place as form of

contraceptive therapy.

 

She admits to a history of chronic drug abuse, having used marijuana, methamphetamine, and

IV heroin this morning.

 

HABITS:  Tobacco use as noted above.  Occasional use of coffee.  Drinks 2 to 3 cans of

carbonated beverage daily.  Denies alcohol use.

 

IMMUNIZATIONS:  Unsure of influenza status.  No other recent vaccinations noted by the

patient.

 

ALLERGIES:  NO NOTED ALLERGIES.

 

 

MEDICATIONS:  No current medications.

 

SOCIAL HISTORY:  Currently not employed.  She is residing with a boyfriend in a private

residence in [place].  She was transported to the emergency room by ambulance service this

past evening.

 

FAMILY HISTORY:  Denies a familial history of recent known COVID infections.  No other noted

significant medical history.

 

REVIEW OF SYSTEMS:  NEUROLOGIC:  No history of strokes or seizures.  Does have paresthesias

in the toes.  Denies chronic ulcerative lesions or peripheral vascular disease.

CARDIAC:  No history of hypertension, diabetes, congenital heart disease, chest pain,

palpitations, or syncope.  Recent echocardiogram revealed severe tricuspid regurgitation as

likely source of lab-cultured MSSA bacteremia and sepsis.

RESPIRATORY:  As above.

GASTROINTESTINAL:  Status post cholecystectomy.  Denies a history of hepatitis, jaundice,

chronic dyspepsia, nausea, emesis, or diarrhea.  No melena or hematochezia.

GENITOURINARY:  G2 female, IUD in place.  No urinary incontinence.  Denies history of noted

chronic renal disease.

MUSCULOSKELETAL:  Without complaints.

 

PHYSICAL EXAMINATION:

GENERAL:  Appearance is that of a  female in moderate respiratory distress

with pleuritic chest pain and shortness of breath.

VITAL SIGNS:  On presentation, temperature 36.7 degrees centigrade, pulse 121 with sinus

tachycardia, respiratory rate 20, blood pressure 123/76, O2 saturations 95% on room air.

HEENT:  Hearing is intact.  Sclerae anicteric.  Extraocular eye movements are intact without

nystagmus.  No nasal congestion.  No oropharyngeal lesions.

NECK:  Brisk carotid pulses.  No bruits, JVD, adenopathy, or thyromegaly.

LUNGS:  Diminished sounds throughout.  No retractions.  No wheezes heard.  Mild expiratory

rhonchi bilaterally.

HEART:  Regular, tachycardic.  No murmurs or gallops noted.  No irregularity.

ABDOMEN:  Obese, soft, nontender, nondistended.  No organomegaly.  Active sounds.  Good

femoral pulses.  No abdominal bruits.

EXTREMITIES:  Good arterial pulses.

SKIN:  Warm and pink with capillary refill less than 3 seconds.  She has scattered

maculopapular lesions erupting over the distal legs bilaterally.  No vesicles.  No weeping

changes.  Nontender.  No petechiae or blanching.

 

DIAGNOSTIC DATA:  CT chest on admission, no evidence of pulmonary emboli.  Pulmonary nodules

bilaterally noted.  Small right pleural effusion and bibasilar consolidation consistent with

atelectasis or pneumonia.

 

CT of the abdomen and pelvis obtained showed no evidence of obstructive process or local

inflammation of the bowel.  Mild anasarca.  IUD is identified to be eroding through the

outer wall of the uterus, status post cholecystectomy.

 

LABORATORY DATA:  On admission; WBC 11.6 with 66 neutrophils, 22 lymphocytes, 10 monos,

hemoglobin low at 8.0, platelet count 612,000.  D-dimer 8975.  Sodium 135, potassium 4.3,

BUN 27, creatinine 1.7, GFR 38, glucose 95, alkaline phosphatase 85, ALT 43, AST 48, C-

reactive protein 19.8, proBNP 1.  HCG is negative.  Urinalysis, moderate occult blood on

dipstick, 5 to 10 rbc's, negative leukocyte esterase.  Additional lab studies including

COVID and influenza nasopharyngeal smear, sputum culture including AFB smear and blood

cultures are pending at this time.

 

IMPRESSION:

1. Acute pulmonary sepsis, unknown organism.

2. Recent hospitalization with methicillin-sensitive Staph aureus, thought to be secondary

    to right-sided endocarditis with noted severe tricuspid regurgitation on

    echocardiogram.

3. Chronic drug use including IV heroin, methamphetamine, and marijuana.

4. Anemia.

5. Mild renal insufficiency on presentation.

 

PLAN:  The patient is admitted to the medical floor.  We will maintain isolation.  We will

initiate IV antibiotics after collection of sputum cultures with IV vancomycin and meropenem

to be initiated.  Respiratory therapies will include supplemental oxygen and albuterol

nebulizers provided p.r.n.

 

Recognizing recent history of IV drug use, we will provide p.r.n. lorazepam and scheduled

methadone for potential withdrawal symptoms.  Allow regular diet.  Routine vitals and

activity as tolerated in room.  Full code status is to be maintained.

 

 

 

 

Parker Hager MD

DD:  11/09/2020 03:36:36

DT:  11/09/2020 04:13:42

Job #:  [id number]

## 2020-11-09 NOTE — CRLCT
INDICATION:



Distended abdomen



TECHNIQUE:



Axial images were obtained from the diaphragm to the pubic symphysis.



Reformats were obtained in the coronal and sagittal plane.



IV Contrast: None



Oral Contrast: None



COMPARISON:



None. 



FINDINGS:



Liver: Unremarkable. Normal in size and attenuation. No masses. 



Gallbladder and bile ducts: Status post cholecystectomy. 



Spleen: Unremarkable. Normal in size without mass.



Pancreas: Unremarkable. No mass or inflammation. 



Adrenal glands: Unremarkable. No nodules. 



Kidneys: Unremarkable. No masses, stones, or hydronephrosis. 



Vasculature: Unremarkable.



GI tract: The stomach is unremarkable. No dilated loops of large or small 

intestine. Mild anasarca.



Pelvis: Anteverted uterus with no made of malposition of the intrauterine 

device with the right limb extending through the outer wall of the uterus 

(2, 119). 



Bones: Minimal degenerative disc disease lumbar spine. 



IMPRESSION:



1. No dilated bowel or localized inflammation.



2. Mild anasarca.



3. Incidental malposition of the patient`s intrauterine device with the 

right limb extending through the myometrium and outer wall of the uterus.



Please note that all CT scans at this facility use dose modulation, 

iterative reconstruction, and/or weight-based dosing when appropriate to 

reduce radiation dose to as low as reasonably achievable.



Dictated by Aamir Garcia MD @ Nov 9 2020 12:41AM



Signed by Dr. Aamir Garcia @ Nov 9 2020 12:57AM

## 2021-02-02 ENCOUNTER — HOSPITAL ENCOUNTER (EMERGENCY)
Dept: HOSPITAL 11 - JP.ED | Age: 21
Discharge: HOME | End: 2021-02-02
Payer: MEDICAID

## 2021-02-02 DIAGNOSIS — F15.10: ICD-10-CM

## 2021-02-02 DIAGNOSIS — F17.210: ICD-10-CM

## 2021-02-02 DIAGNOSIS — Z79.899: ICD-10-CM

## 2021-02-02 DIAGNOSIS — N39.0: Primary | ICD-10-CM

## 2021-02-02 NOTE — EDM.PDOC
ED HPI GENERAL MEDICAL PROBLEM





- General


Chief Complaint: General


Stated Complaint: CHEST PAIN, NOT FEELING WELL


Time Seen by Provider: 21 15:56


Source of Information: Reports: Patient


History Limitations: Reports: No Limitations





- History of Present Illness


INITIAL COMMENTS - FREE TEXT/NARRATIVE: 





20-year-old female with a long history of drug abuse, bacterial endocarditis and

significant medical noncompliance comes into the emergency room just not feeling

well with some pleuritic chest pain, intermittent shortness of breath and 

fatigue.  She was in [facility] being treated for endocarditis and 

apparent congestive heart failure which she left AMA after 2 days.  She is 

supposed to be on for cardiac medications, takes none of them and continues to 

do heroin.  I am not sure exactly what brought her in today but she actually 

looks stable.  I think it was mostly the pleuritic chest pain that is bothering 

her, especially on the left side.  She is also having some muscle cramps.


Onset: Unknown/Unsure


Associated Symptoms: Reports: Chest Pain, Cough, Malaise, Weakness, Other 

(Muscle aches and pains, back pain)


  ** Chest


Pain Score (Numeric/FACES): 10





- Related Data


                                    Allergies











Allergy/AdvReac Type Severity Reaction Status Date / Time


 


No Known Allergies Allergy   Verified 21 15:28











Home Meds: 


                                    Home Meds





Acetaminophen [Tylenol Extra Strength] 1,000 mg PO Q6H PRN 21 [History]


Furosemide [Lasix] 20 mg PO DAILY 21 [History]


Linezolid [Zyvox] 600 mg PO BID 21 [History]


Losartan [Cozaar] 50 mg PO DAILY 21 [History]


carvediloL [Coreg] 12.5 mg PO BID 21 [History]











Past Medical History


Cardiovascular History: Reports: Other (See Below)


Other Cardiovascular History: currently tachycardiac, endocarditis.


Respiratory History: Reports: Pneumonia, Recurrent, Other (See Below)


Other Respiratory History: smokes ten cigarettes a day


OB/GYN History: Reports: Pregnancy


Other OB/GYN History: 


Musculoskeletal History: Reports: Fracture


Psychiatric History: Reports: Addiction, Anxiety, Depression


Dermatologic History: Reports: Other (See Below)


Other Dermatologic History: rash on bilateral lower extremities.





- Past Surgical History


HEENT Surgical History: Reports: Oral Surgery, Other (See Below)


Other HEENT Surgeries/Procedures: wisdom teeth removal


Respiratory Surgical History: Reports: None


GI Surgical History: Reports: Cholecystectomy





Social & Family History





- Family History


Family Medical History: No Pertinent Family History





- Tobacco Use


Tobacco Use Status *Q: Current Every Day Tobacco User


Years of Tobacco use: 4


Packs/Tins Daily: 1


Used Tobacco, but Quit: No





- Caffeine Use


Caffeine Use: Reports: Coffee, Soda





- Recreational Drug Use


Recreational Drug Use: Yes


Recreational Drug Type: Reports: Heroin, Marijuana/Hashish, Methamphetamine


Recreational Drug Use Frequency: Daily


Recreational Drug Last Use: Heroin used today





- Living Situation & Occupation


Living situation: Reports: Single, with Family


Occupation: Unemployed (lives with Dad in [facility]. Has two chi

ldren ages 8 years and 7 years - does not have custody they live with her 

Cousin.)





ED ROS GENERAL





- Review of Systems


Review Of Systems: See Below


Constitutional: Reports: Malaise, Decreased Appetite.  Denies: Fever


HEENT: Reports: No Symptoms.  Denies: Vision Change


Respiratory: Reports: Shortness of Breath, Pleuritic Chest Pain


Cardiovascular: Reports: Chest Pain, Lightheadedness.  Denies: Palpitations


Endocrine: Reports: Fatigue


GI/Abdominal: Reports: Nausea, Vomiting


: Reports: No Symptoms


Skin: Reports: Other (Scars and track marks in both arms from IV drug abuse)


Neurological: Denies: Headache





ED EXAM, GENERAL





- Physical Exam


Exam: See Below


Exam Limited By: No Limitations


General Appearance: Alert, No Apparent Distress


Eye Exam: Bilateral Eye: EOMI, Normal Fundi


Head: Atraumatic


Neck: Supple, Non-Tender


Respiratory/Chest: Other (Decreased breath sounds in the left base but otherwise

 clear)


Cardiovascular: Regular Rate, Rhythm, Tachycardia.  No: Diastolic Murmur, 

Systolic Murmur


GI/Abdominal: Soft, Other (Lower abdomen feels full)


Extremities: Other (Track marks in the antecubital areas of both arms, no 

peripheral edema)


Neurological: Alert, Oriented, No Motor/Sensory Deficits


Psychiatric: Flat Affect





Course





- Vital Signs


Last Recorded V/S: 


                                Last Vital Signs











Temp  99.3 F   21 15:32


 


Pulse  124 H  21 15:32


 


Resp  18   21 15:32


 


BP  132/73   21 15:32


 


Pulse Ox  95   21 15:32














- Orders/Labs/Meds


Labs: 


                                Laboratory Tests











  21 Range/Units





  16:10 16:10 16:19 


 


WBC  12.9 H    (4.5-11.0)  K/uL


 


RBC  4.52    (3.30-5.50)  M/uL


 


Hgb  12.4  D    (12.0-15.0)  g/dL


 


Hct  38.9    (36.0-48.0)  %


 


MCV  86    (80-98)  fL


 


MCH  27    (27-31)  pg


 


MCHC  32    (32-36)  %


 


Plt Count  300    (150-400)  K/uL


 


Neut % (Auto)  73 H    (36-66)  %


 


Lymph % (Auto)  13 L    (24-44)  %


 


Mono % (Auto)  13 H    (2-6)  %


 


Eos % (Auto)  0 L    (2-4)  %


 


Baso % (Auto)  0    (0-1)  %


 


Sodium   134 L   (140-148)  mmol/L


 


Potassium   3.3 L   (3.6-5.2)  mmol/L


 


Chloride   100   (100-108)  mmol/L


 


Carbon Dioxide   23   (21-32)  mmol/L


 


Anion Gap   14.3 H   (5.0-14.0)  mmol/L


 


BUN   14   (7-18)  mg/dL


 


Creatinine   1.3 H   (0.6-1.0)  mg/dL


 


Est Cr Clr Drug Dosing   58.62   mL/min


 


Estimated GFR (MDRD)   51 L   (>60)  


 


Glucose   118 H   ()  mg/dL


 


Calcium   8.3 L   (8.5-10.1)  mg/dL


 


Total Bilirubin   0.4   (0.2-1.0)  mg/dL


 


AST   39 H   (15-37)  U/L


 


ALT   40   (12-78)  U/L


 


Alkaline Phosphatase   100   ()  U/L


 


Troponin I   < 0.017   (0.000-0.056)  ng/mL


 


Total Protein   7.2   (6.4-8.2)  g/dL


 


Albumin   2.6 L   (3.4-5.0)  g/dL


 


Globulin   4.6 H   (2.3-3.5)  g/dL


 


Albumin/Globulin Ratio   0.6 L   (1.2-2.2)  


 


Urine Color    Red A  (YELLOW)  


 


Urine Appearance    Turbid A  (CLEAR)  


 


Urine pH    5.5  (5.0-8.0)  


 


Ur Specific Gravity    1.025  (1.008-1.030)  


 


Urine Protein    >=300 H  (NEGATIVE)  mg/dL


 


Urine Glucose (UA)    Negative  (NEGATIVE)  mg/dL


 


Urine Ketones    Negative  (NEGATIVE)  mg/dL


 


Urine Occult Blood    Large H  (NEGATIVE)  


 


Urine Nitrite    Positive H  (NEGATIVE)  


 


Urine Bilirubin    Small H  (NEGATIVE)  


 


Urine Urobilinogen    0.2  (0.2-1.0)  EU/dL


 


Ur Leukocyte Esterase    Negative  (NEGATIVE)  


 


Urine RBC    Semi-packed H  (0-5)  


 


Urine WBC    5-10 H  (0-5)  


 


Ur Epithelial Cells    Few  


 


Amorphous Sediment    Not seen  


 


Urine Bacteria    Many  


 


Urine Mucus    Not seen  


 


Urine HCG, Qual     


 


Urine Opiates Screen     (NEGATIVE)  


 


Ur Oxycodone Screen     (NEGATIVE)  


 


Urine Methadone Screen     (NEGATIVE)  


 


Ur Propoxyphene Screen     (NEGATIVE)  


 


Ur Barbiturates Screen     (NEGATIVE)  


 


Ur Tricyclics Screen     (NEGATIVE)  


 


Ur Phencyclidine Scrn     (NEGATIVE)  


 


Ur Amphetamine Screen     (NEGATIVE)  


 


U Methamphetamines Scrn     (NEGATIVE)  


 


Urine MDMA Screen     (NEGATIVE)  


 


U Benzodiazepines Scrn     (NEGATIVE)  


 


U Cocaine Metab Screen     (NEGATIVE)  


 


U Marijuana (THC) Screen     (NEGATIVE)  














  21 Range/Units





  16:19 16:19 


 


WBC    (4.5-11.0)  K/uL


 


RBC    (3.30-5.50)  M/uL


 


Hgb    (12.0-15.0)  g/dL


 


Hct    (36.0-48.0)  %


 


MCV    (80-98)  fL


 


MCH    (27-31)  pg


 


MCHC    (32-36)  %


 


Plt Count    (150-400)  K/uL


 


Neut % (Auto)    (36-66)  %


 


Lymph % (Auto)    (24-44)  %


 


Mono % (Auto)    (2-6)  %


 


Eos % (Auto)    (2-4)  %


 


Baso % (Auto)    (0-1)  %


 


Sodium    (140-148)  mmol/L


 


Potassium    (3.6-5.2)  mmol/L


 


Chloride    (100-108)  mmol/L


 


Carbon Dioxide    (21-32)  mmol/L


 


Anion Gap    (5.0-14.0)  mmol/L


 


BUN    (7-18)  mg/dL


 


Creatinine    (0.6-1.0)  mg/dL


 


Est Cr Clr Drug Dosing    mL/min


 


Estimated GFR (MDRD)    (>60)  


 


Glucose    ()  mg/dL


 


Calcium    (8.5-10.1)  mg/dL


 


Total Bilirubin    (0.2-1.0)  mg/dL


 


AST    (15-37)  U/L


 


ALT    (12-78)  U/L


 


Alkaline Phosphatase    ()  U/L


 


Troponin I    (0.000-0.056)  ng/mL


 


Total Protein    (6.4-8.2)  g/dL


 


Albumin    (3.4-5.0)  g/dL


 


Globulin    (2.3-3.5)  g/dL


 


Albumin/Globulin Ratio    (1.2-2.2)  


 


Urine Color    (YELLOW)  


 


Urine Appearance    (CLEAR)  


 


Urine pH    (5.0-8.0)  


 


Ur Specific Gravity    (1.008-1.030)  


 


Urine Protein    (NEGATIVE)  mg/dL


 


Urine Glucose (UA)    (NEGATIVE)  mg/dL


 


Urine Ketones    (NEGATIVE)  mg/dL


 


Urine Occult Blood    (NEGATIVE)  


 


Urine Nitrite    (NEGATIVE)  


 


Urine Bilirubin    (NEGATIVE)  


 


Urine Urobilinogen    (0.2-1.0)  EU/dL


 


Ur Leukocyte Esterase    (NEGATIVE)  


 


Urine RBC    (0-5)  


 


Urine WBC    (0-5)  


 


Ur Epithelial Cells    


 


Amorphous Sediment    


 


Urine Bacteria    


 


Urine Mucus    


 


Urine HCG, Qual  Negative   


 


Urine Opiates Screen   Presumptive positive H  (NEGATIVE)  


 


Ur Oxycodone Screen   Negative  (NEGATIVE)  


 


Urine Methadone Screen   Negative  (NEGATIVE)  


 


Ur Propoxyphene Screen   Negative  (NEGATIVE)  


 


Ur Barbiturates Screen   Negative  (NEGATIVE)  


 


Ur Tricyclics Screen   Negative  (NEGATIVE)  


 


Ur Phencyclidine Scrn   Negative  (NEGATIVE)  


 


Ur Amphetamine Screen   Presumptive positive H  (NEGATIVE)  


 


U Methamphetamines Scrn   Presumptive positive H  (NEGATIVE)  


 


Urine MDMA Screen   Negative  (NEGATIVE)  


 


U Benzodiazepines Scrn   Negative  (NEGATIVE)  


 


U Cocaine Metab Screen   Negative  (NEGATIVE)  


 


U Marijuana (THC) Screen   Presumptive positive H  (NEGATIVE)  














- Re-Assessments/Exams


Free Text/Narrative Re-Assessment/Exam: 





21 16:08


2 view chest x-ray will be obtained as well as CBC CMP UA urine pregnancy and 

urine drug screen.


21 17:01


Urine drug screen is positive for methamphetamine and opiates and marijuana, and

 ultrasound screen of the abdomen showed no pregnancy and urine pregnancy was 

negative.  Two-view chest x-ray showed no evidence of congestive failure, 

ultrasound screening of the heart showed no significant dilatation or effusion. 

 White count is mildly elevated, hemoglobin normal, electrolytes are reasonably 

normal.  Troponin is 0.  Urine will be cultured and patient will be placed on 

Macrobid twice daily for 7 days, and should follow up with Ana Cooper in 1 to 2

 weeks.





Departure





- Departure


Time of Disposition: 17:08


Disposition: Home, Self-Care 01


Clinical Impression: 


 UTI (urinary tract infection), Methamphetamine abuse








- Discharge Information


Instructions:  Urinary Tract Infection, Adult, Easy-to-Read, Methamphetamines 

Use Disorder


Referrals: 


Ana Cooper DO [Primary Care Provider] - 


Forms:  ED Department Discharge


Care Plan Goals: 


Stop abusing illicit drugs such as methamphetamine and heroin.  Take antibiotic 

as prescribed and recheck with Ana Cooper in 1 to 2 weeks.  Return sooner if 

worsening or concerns.





Sepsis Event Note (ED)





- Evaluation


Sepsis Screening Result: No Definite Risk

## 2021-02-04 NOTE — CR
CHEST: 2 view

 

CLINICAL HISTORY:Dyspnea

 

COMPARISON:CT November 2020

 

FINDINGS:  The heart size, pulmonary vascularity and hilar structures are

normal. No infiltrate effusion or pneumothorax is seen.

 

IMPRESSION: No acute cardiopulmonary process.